# Patient Record
Sex: MALE | Race: WHITE | NOT HISPANIC OR LATINO | Employment: FULL TIME | URBAN - METROPOLITAN AREA
[De-identification: names, ages, dates, MRNs, and addresses within clinical notes are randomized per-mention and may not be internally consistent; named-entity substitution may affect disease eponyms.]

---

## 2017-07-24 DIAGNOSIS — M02.30 REITER'S DISEASE, REITER'S DISEASE OF UNSPECIFIED SITE: ICD-10-CM

## 2017-07-24 NOTE — TELEPHONE ENCOUNTER
Routing refill request to provider for review/approval because:  Drug not on the [facility] refill protocol   Alicia Villanueva RN

## 2017-07-24 NOTE — TELEPHONE ENCOUNTER
adalimumab (HUMIRA PEN) 40 MG/0.8ML pen kit         Last Written Prescription Date: 06/14/16  Last Fill Quantity: 2, # refills: 12  Last Office Visit with [facility], P or [facility] prescribing provider:  11/16/16        BP Readings from Last 3 Encounters:   11/16/16 102/64   06/14/16 116/68   01/15/16 118/64     No results found for: MICROL  No results found for: UMALCR  Creatinine   Date Value Ref Range Status   06/14/2016 0.82 0.66 - 1.25 mg/dL Final   ]  GFR Estimate   Date Value Ref Range Status   06/14/2016 >90  Non  GFR Calc   >60 mL/min/1.7m2 Final   12/12/2014 81 >60 mL/min/1.7m2 Final     Comment:     Non  GFR Calc   11/19/2012 >90 >60 mL/min/1.7m2 Final     GFR Estimate If Black   Date Value Ref Range Status   06/14/2016 >90   GFR Calc   >60 mL/min/1.7m2 Final   12/12/2014 >90   GFR Calc   >60 mL/min/1.7m2 Final   11/19/2012 >90 >60 mL/min/1.7m2 Final     Lab Results   Component Value Date    CHOL 277 10/11/2012     Lab Results   Component Value Date    HDL 63 10/11/2012     Lab Results   Component Value Date     10/11/2012     Lab Results   Component Value Date    TRIG 99 10/11/2012     Lab Results   Component Value Date    CHOLHDLRATIO 4.4 10/11/2012     Lab Results   Component Value Date    AST 24 06/14/2016     Lab Results   Component Value Date    ALT 28 06/14/2016     No results found for: A1C  Potassium   Date Value Ref Range Status   06/14/2016 3.9 3.4 - 5.3 mmol/L Final         [facility]

## 2017-07-25 ENCOUNTER — TELEPHONE (OUTPATIENT)
Dept: RHEUMATOLOGY | Facility: CLINIC | Age: 42
End: 2017-07-25

## 2017-07-25 DIAGNOSIS — M02.30 REITER'S DISEASE, REITER'S DISEASE OF UNSPECIFIED SITE: ICD-10-CM

## 2017-07-25 NOTE — TELEPHONE ENCOUNTER
Patient needs a follow up appointment.  Will call patient to schedule.  Ira Mosqueda CMA  7/25/2017 4:17 PM

## 2017-07-25 NOTE — TELEPHONE ENCOUNTER
Patient needs a follow up appointment before prescription for Humira will be refilled.   Ira Mosqueda CMA  7/25/2017 4:15 PM

## 2017-07-26 NOTE — TELEPHONE ENCOUNTER
3 month supply of Humira refilled.  Please follow up in clinic for future refills.  Please notify Rahul Lev of this.     Roosevelt Flores MD  7/26/2017 1:53 PM

## 2017-07-26 NOTE — TELEPHONE ENCOUNTER
As of today 7/26/17 we only have 2 openings for the month of August between all 3 clinics. August 30th and 31st. Will talk to Dr. Flores to see what he would like done.  Ira Mosqueda CMA  7/26/2017 1:40 PM

## 2017-07-26 NOTE — TELEPHONE ENCOUNTER
Per demographics detailed voice message was left for patient about refill for a 3 month supply of Humira was sent to pharmacy and please make a follow up appointment with Dr. Flores before medication will run out.  Ira Mosqueda CMA  7/26/2017 2:08 PM

## 2017-07-27 ENCOUNTER — TELEPHONE (OUTPATIENT)
Dept: RHEUMATOLOGY | Facility: CLINIC | Age: 42
End: 2017-07-27

## 2017-07-27 NOTE — TELEPHONE ENCOUNTER
Pt is restricted to fill his Humira at [facility] specialty pharmacy at [phone]. Having [facility] transfer med out pt also will be notified

## 2017-09-14 ENCOUNTER — OFFICE VISIT (OUTPATIENT)
Dept: RHEUMATOLOGY | Facility: CLINIC | Age: 42
End: 2017-09-14
Payer: COMMERCIAL

## 2017-09-14 VITALS
OXYGEN SATURATION: 97 % | HEIGHT: 76 IN | WEIGHT: 183.6 LBS | HEART RATE: 64 BPM | DIASTOLIC BLOOD PRESSURE: 60 MMHG | SYSTOLIC BLOOD PRESSURE: 104 MMHG | TEMPERATURE: 97.3 F | BODY MASS INDEX: 22.36 KG/M2

## 2017-09-14 DIAGNOSIS — Z23 NEED FOR PROPHYLACTIC VACCINATION AND INOCULATION AGAINST CHOLERA ALONE: ICD-10-CM

## 2017-09-14 DIAGNOSIS — M02.30 REACTIVE ARTHRITIS (H): Primary | ICD-10-CM

## 2017-09-14 DIAGNOSIS — Z79.899 HIGH RISK MEDICATION USE: ICD-10-CM

## 2017-09-14 LAB
ALBUMIN SERPL-MCNC: 3.8 G/DL (ref 3.4–5)
ALP SERPL-CCNC: 50 U/L (ref 40–150)
ALT SERPL W P-5'-P-CCNC: 30 U/L (ref 0–70)
AST SERPL W P-5'-P-CCNC: 20 U/L (ref 0–45)
BASOPHILS # BLD AUTO: 0 10E9/L (ref 0–0.2)
BASOPHILS NFR BLD AUTO: 0.4 %
BILIRUB DIRECT SERPL-MCNC: 0.2 MG/DL (ref 0–0.2)
BILIRUB SERPL-MCNC: 1.2 MG/DL (ref 0.2–1.3)
CALCIUM SERPL-MCNC: 8.8 MG/DL (ref 8.5–10.1)
CREAT SERPL-MCNC: 0.83 MG/DL (ref 0.66–1.25)
DEPRECATED CALCIDIOL+CALCIFEROL SERPL-MC: 33 UG/L (ref 20–75)
DIFFERENTIAL METHOD BLD: NORMAL
EOSINOPHIL # BLD AUTO: 0.2 10E9/L (ref 0–0.7)
EOSINOPHIL NFR BLD AUTO: 3.2 %
ERYTHROCYTE [DISTWIDTH] IN BLOOD BY AUTOMATED COUNT: 12.9 % (ref 10–15)
GFR SERPL CREATININE-BSD FRML MDRD: >90 ML/MIN/1.7M2
HCT VFR BLD AUTO: 42 % (ref 40–53)
HGB BLD-MCNC: 14.8 G/DL (ref 13.3–17.7)
LYMPHOCYTES # BLD AUTO: 2.5 10E9/L (ref 0.8–5.3)
LYMPHOCYTES NFR BLD AUTO: 44.4 %
MCH RBC QN AUTO: 32.7 PG (ref 26.5–33)
MCHC RBC AUTO-ENTMCNC: 35.2 G/DL (ref 31.5–36.5)
MCV RBC AUTO: 93 FL (ref 78–100)
MONOCYTES # BLD AUTO: 0.5 10E9/L (ref 0–1.3)
MONOCYTES NFR BLD AUTO: 9.1 %
NEUTROPHILS # BLD AUTO: 2.4 10E9/L (ref 1.6–8.3)
NEUTROPHILS NFR BLD AUTO: 42.9 %
PLATELET # BLD AUTO: 278 10E9/L (ref 150–450)
PROT SERPL-MCNC: 7.4 G/DL (ref 6.8–8.8)
RBC # BLD AUTO: 4.53 10E12/L (ref 4.4–5.9)
WBC # BLD AUTO: 5.6 10E9/L (ref 4–11)

## 2017-09-14 PROCEDURE — 82565 ASSAY OF CREATININE: CPT | Performed by: INTERNAL MEDICINE

## 2017-09-14 PROCEDURE — 90471 IMMUNIZATION ADMIN: CPT | Performed by: INTERNAL MEDICINE

## 2017-09-14 PROCEDURE — 36415 COLL VENOUS BLD VENIPUNCTURE: CPT | Performed by: INTERNAL MEDICINE

## 2017-09-14 PROCEDURE — 90670 PCV13 VACCINE IM: CPT | Performed by: INTERNAL MEDICINE

## 2017-09-14 PROCEDURE — 85025 COMPLETE CBC W/AUTO DIFF WBC: CPT | Performed by: INTERNAL MEDICINE

## 2017-09-14 PROCEDURE — 82306 VITAMIN D 25 HYDROXY: CPT | Performed by: INTERNAL MEDICINE

## 2017-09-14 PROCEDURE — 99213 OFFICE O/P EST LOW 20 MIN: CPT | Mod: 25 | Performed by: INTERNAL MEDICINE

## 2017-09-14 PROCEDURE — 82310 ASSAY OF CALCIUM: CPT | Performed by: INTERNAL MEDICINE

## 2017-09-14 PROCEDURE — 80076 HEPATIC FUNCTION PANEL: CPT | Performed by: INTERNAL MEDICINE

## 2017-09-14 NOTE — PROGRESS NOTES
Rheumatology Clinic Visit      Viet Ohara MRN# [id number]   YOB: 1975 Age: 42 year old      Date of visit: 9/14/17   PCP: Dr. Trina Espinoza    Chief Complaint   Patient presents with:  Arthritis: patient states the humira is helping, his fingers are doing good    Assessment and Plan     1. Reactive arthritis: From chart review, he was in [place] where he developed a gastrointestinal illness and was later diagnosed with reactive arthritis with dactylitis in 2012. Per the patient, previous treatments include prednisone (ineffective), methotrexate (ineffective), sulfasalazine (ineffective), then Humira that was very effective. He discontinued Humira because he was doing well and all of the symptoms returned. Asymptomatic after restarting Humira. Currently doing very well. He is trying to extend Humira to every 3 weeks consistently and this is okay; if he has return of symptoms then he will return to the frquency that was previously effective.  Never had systemic symptoms such as fatigue.   Calcium low on previous labs so will recheck Ca and also check Vitamin D today.   - Continue Humira 40 mg subcutaneous every 14 days  - Labs today: CBC, Cr, Hepatic Panel, Vitamin D, Ca    # Adalimumab (Humira) Risks and Benefits: The risks and benefits of adalimumab were discussed in detail and the patient verbalized understanding.  The risks discussed include, but are not limited to, the risk for hypersensitivity, anaphylaxis, anaphylactoid reactions, an increased risk for serious infections leading to hospitalization or death, a possible increased risk for lymphoma and other malignancies, a possible worsening of demyelinating diseases, a possible worsening of heart failure, risk for cytopenias, risk for drug induced lupus, possible reactivation of hepatitis B, and possible reactivation of latent tuberculosis.  Subcutaneous injections may result in injection site reactions and/or pain at the site of injection.   The most common adverse reactions are infections, injection site reactions, headache, and rash.  It was discussed that the medication would need to be discontinued if a serious infection develops.  It was discussed that live vaccinations should not be received while using adalimumab or within 30 days prior to starting adalimumab.  I encouraged reviewing the package insert and asking any questions about the medication.      2. Vaccinations: Vaccinations reviewed with Mr. Ohara.  Risks and benefits of vaccinations were discussed.  - Influenza: he plans to receive at the [facility] with his family  - [id number]: will receive today  - [id number]: up to date  - Zostavax: contraindicated at this time    Mr. Ohara verbalized agreement with and understanding of the rational for the diagnosis and treatment plan.  All questions were answered to best of my ability and the patient's satisfaction. Mr. Ohara was advised to contact the clinic with any questions that may arise after the clinic visit.      Thank you for involving me in the care of the patient    Return to clinic: 11-12 months      HPI   Viet Ohara is a 42 year old male with a medical history significant for reactive arthritis, rupture of plantaris tendon, and history of trigger finger who presents for follow-up of reactive arthritis.    Previously, Mr. Ohara reported that prednisone was effective in the past but NSAIDs, methotrexate, and sulfasalazine were ineffective. Humira has been very effective. He discontinued Humira one point and all the symptoms returned. Later, he started taking Humira every 3 weeks instead of every 2 weeks and his symptoms returned.      Today, he reports doing very well with no joint pain or joint stiffness. He is trying to use Humira every 2.5-3 weeks instead of every 14 days and says that his symptoms are still well controlled. No gelling phenomenon.  No fatigue. He plans to receive the flu shot at the [facility] with his family, something  they do every year.      Denies fevers, chills, nausea, vomiting, constipation, diarrhea. No abdominal pain. No chest pain/pressure, palpitations, or shortness of breath. No oral or nasal sores. No neck pain. No rash. No LE swelling.  No photosensitivity or photophobia.  No sicca symptoms.  No eye pain or redness.     Tobacco: none  EtOH: 1 beer daily  Drugs: none  Occupation: Operates the camera for [employer]    ROS   GEN: No fevers, chills, night sweats, fatigue, or weight change  SKIN: No itching, rashes, sores  HEENT:  No epistaxis. No oral or nasal ulcers.  CV: No chest pain, pressure, palpitations, or dyspnea on exertion.  PULM: No SOB, wheeze, cough.  GI: No nausea, vomiting, constipation, diarrhea. No blood in stool. No abdominal pain.  : No blood in urine.  MSK: See HPI.  NEURO: No numbness, tingling, or weakness.  EXT: No LE swelling  PSYCH: negative    Active Problem List     Patient Active Problem List   Diagnosis     CARDIOVASCULAR SCREENING; LDL GOAL LESS THAN 160     Trigger finger     Reactive arthritis (H)     Encounter for long-term current use of medication     Rupture of plantaris tendon     Baker's cyst of knee     High risk medication use     Past Medical History     Past Medical History:   Diagnosis Date     CARDIOVASCULAR SCREENING; LDL GOAL LESS THAN 160 10/31/2010     Reactive arthritis (H)      Trigger finger     Bilateral Hands that require periodic steroid shots     Past Surgical History     Past Surgical History:   Procedure Laterality Date     C NONSPECIFIC PROCEDURE      multiple hand/ortho for trigger finger release x 4     Allergy     Allergies   Allergen Reactions     Nkda [No Known Drug Allergies]      Current Medication List     Current Outpatient Prescriptions   Medication Sig     adalimumab (HUMIRA PEN) 40 MG/0.8ML pen kit Inject 0.8 mLs (40 mg) Subcutaneous every 14 days     No current facility-administered medications for this visit.      Social History   See  "HPI    Family History     Family History   Problem Relation Age of Onset     CANCER Paternal Grandfather      lung +tob     C.A.D. No family hx of      DIABETES No family hx of      Hypertension No family hx of      CEREBROVASCULAR DISEASE No family hx of      Connective Tissue Disorder No family hx of      Physical Exam     Temp Readings from Last 3 Encounters:   09/14/17 97.3  F (36.3  C) (Oral)   11/16/16 97.7  F (36.5  C)   06/14/16 98.3  F (36.8  C) (Oral)     BP Readings from Last 5 Encounters:   09/14/17 104/60   11/16/16 102/64   06/14/16 116/68   01/15/16 118/64   09/11/15 95/58     Pulse Readings from Last 1 Encounters:   09/14/17 64     Resp Readings from Last 1 Encounters:   11/16/16 16     Estimated body mass index is 22.65 kg/(m^2) as calculated from the following:    Height as of this encounter: 1.918 m (6' 3.5\").    Weight as of this encounter: 83.3 kg (183 lb 9.6 oz).    GEN: NAD  HEENT: MMM. No oral lesions. Anicteric, noninjected sclera  CV: S1, S2. RRR. No m/r/g.  PULM: CTA bilaterally. No w/c.  MSK:  Hands, wrists, elbows, shoulders, knees, ankles, and MTPs without swelling or tenderness to palpation. Hips nontender to direct palpation. Achilles tendons nontender to palpation. No dactylitis.    NEURO: UE and LE strengths 5/5 and equal bilaterally.   SKIN: No rash  EXT: No LE edema  PSYCH: Alert. Appropriate.    Labs / Imaging (select studies)   RF/CCP  Recent Labs   Lab Test  09/25/12   1411  06/26/12   1030  06/05/12   1059   CCPABY  <20 Interpretation:  Negative   --   <20 Interpretation:  Negative   RHF  9  7   --      LATRICE/RNP/Sm/SSA/SSB  Recent Labs   Lab Test  06/26/12   1030   OPAL  <1.0 Interpretation:  Negative     HLA-B27  Recent Labs   Lab Test  12/12/14   1052  10/18/12   1241   [id number]  SSOP  SSOP   B1  B27 Neg  B27 Neg     CBC  Recent Labs   Lab Test  11/16/16   0949  06/14/16   1419  01/15/16   0846   WBC  5.7  6.7  5.1   RBC  4.50  4.48  4.74   HGB  14.5  14.5  14.9   HCT  41.9 "  41.5  43.7   MCV  93  93  92   RDW  12.5  12.6  12.6   PLT  269  272  280   MCH  32.2  32.4  31.4   MCHC  34.6  34.9  34.1   NEUTROPHIL  51.7  46.3  45.5   LYMPH  36.5  44.5  42.7   MONOCYTE  8.7  7.0  8.5   EOSINOPHIL  2.8  1.9  3.1   BASOPHIL  0.3  0.3  0.2   ANEU  3.0  3.1  2.3   ALYM  2.1  3.0  2.2   BALWINDER  0.5  0.5  0.4   AEOS  0.2  0.1  0.2   ABAS  0.0  0.0  0.0     CMP  Recent Labs   Lab Test  06/14/16   1419  01/15/16   0846  09/11/15   0903  05/05/15   0810   12/12/14   1052  11/19/12   0852 10/11/12  09/25/12   1411  04/06/12   1424   06/02/10   1144   NA  138   --    --    --    --   136   --   138   --   140   --    --    POTASSIUM  3.9   --    --    --    --   4.2   --   4.1   --   4.4   --    --    CHLORIDE  105   --    --    --    --   102   --   104   --   99   --    --    CO2  24   --    --    --    --   29   --    --    --   26   --    --    ANIONGAP  9   --    --    --    --   5   --    --    --   15   --    --    GLC  90   --    --    --    --   81   --   96   --   103*   --   83   BUN  11   --    --    --    --   15   --   16   --   15   --    --    CR  0.82   --    --    --    --   1.02  0.77  0.9  0.80  0.82   < >   --    GFRESTIMATED  >90  Non  GFR Calc     --    --    --    --   81  >90  >60  >90  >90   < >   --    GFRESTBLACK  >90   GFR Calc     --    --    --    --   >90   GFR Calc    >90   --   >90  >90   < >   --    LULU  8.4*   --    --    --    --   8.8   --   9.5   --   8.8   --    --    BILITOTAL  1.4*  1.4*  1.4*  1.3   < >  1.0   --   1.4   --   0.6   < >   --    ALBUMIN  3.9  4.0  4.0  3.8   < >  4.1  3.7*  3.5   --   4.0   < >   --    PROTTOTAL  7.8  7.5  7.6  7.4   < >  7.9   --   7.4   --   8.2   < >   --    ALKPHOS  53  60  51  59   < >  63   --   346*  63  73   < >   --    AST  24  25  33  20   < >  25  46*  83*  30  33   < >   --    ALT  28  30  41  28   < >  43  58  122*   --   19   < >   --     < > = values in this interval  not displayed.     Uric Acid  Recent Labs   Lab Test  09/25/12   1411   URIC  5.3     Iron Studies  Recent Labs   Lab Test  04/06/12   1424   IRON  43   FEB  249   IRONSAT  17     Calcium/VitaminD  Recent Labs   Lab Test  06/14/16   1419  12/12/14   1052 10/11/12   LULU  8.4*  8.8  9.5     ESR/CRP  Recent Labs   Lab Test  12/12/14   1052  11/19/12   0852  09/25/12   1411  06/26/12   1030   SED   --   10  13  25*   CRP  <2.9  5.3  6.2  6.2     TSH/T4  Recent Labs   Lab Test  04/06/12   1424   TSH  1.66     Lipid Panel  Recent Labs   Lab Test 10/11/12  06/02/10   1144   CHOL  277*  196   TRIG  99   --    HDL  63  34*   LDL  194*   --    VLDL  19   --    CHOLHDLRATIO  4.4   --      Hepatitis B  Recent Labs   Lab Test  06/14/16   1419  05/08/15   0928  01/17/13   1554   HBCAB  Nonreactive   --    --    HEPBANG  Nonreactive  Nonreactive  Negative     Hepatitis C  Recent Labs   Lab Test  06/14/16   1419  05/08/15   0928  01/17/13   1554   HCVAB  Nonreactive   Assay performance characteristics have not been established for newborns,   infants, and children    Nonreactive   Assay performance characteristics have not been established for newborns,   infants, and children    Negative     Tuberculosis Screening  Recent Labs   Lab Test  06/14/16   1418  05/08/15   0930  01/17/13   1553   TBRSLT  Indeterminate   Results are indeterminate, possible impaired immune response.  Consider   submitting a repeat sample in 1 to 2 months.  *  Negative  Negative   TBAGN  0.00  0.01  0.03     HIV Screening  Recent Labs   Lab Test  06/14/16   1419   HIAGAB  Nonreactive   HIV-1 p24 Ag & HIV-1/HIV-2 Ab Not Detected       Immunization History     Immunization History   Administered Date(s) Administered     Influenza (IIV3) 11/15/2011     Pneumococcal 23 valent 01/17/2013     TDAP Vaccine (Adacel) 02/20/2015          Chart documentation done in part with Dragon Voice recognition Software. Although reviewed after completion, some word and  grammatical error may remain.    Roosevelt Flores MD

## 2017-09-14 NOTE — NURSING NOTE
"Chief Complaint   Patient presents with     Arthritis     patient states the humira is helping, his fingers are doing good       Initial /60 (BP Location: Left arm, Patient Position: Chair, Cuff Size: Adult Regular)  Pulse 64  Temp 97.3  F (36.3  C) (Oral)  Ht 1.918 m (6' 3.5\")  Wt 83.3 kg (183 lb 9.6 oz)  SpO2 97%  BMI 22.65 kg/m2 Estimated body mass index is 22.65 kg/(m^2) as calculated from the following:    Height as of this encounter: 1.918 m (6' 3.5\").    Weight as of this encounter: 83.3 kg (183 lb 9.6 oz).  BP completed using cuff size: regular         RAPID3 (0-30) Cumulative Score  0          RAPID3 Weighted Score (divide #4 by 3 and that is the weighted score)  0         "

## 2017-09-14 NOTE — LETTER
[facility]  [address] [place] [address]    September 18, 2017    Viet Ohara  [address] [place] [id number]          Dear Viet,    Your labs are normal.     Please let me know if you have any questions    Enclosed is a copy of your results.     Results for orders placed or performed in visit on 09/14/17   CBC with platelets differential   Result Value Ref Range    WBC 5.6 4.0 - 11.0 10e9/L    RBC Count 4.53 4.4 - 5.9 10e12/L    Hemoglobin 14.8 13.3 - 17.7 g/dL    Hematocrit 42.0 40.0 - 53.0 %    MCV 93 78 - 100 fl    MCH 32.7 26.5 - 33.0 pg    MCHC 35.2 31.5 - 36.5 g/dL    RDW 12.9 10.0 - 15.0 %    Platelet Count 278 150 - 450 10e9/L    Diff Method Automated Method     % Neutrophils 42.9 %    % Lymphocytes 44.4 %    % Monocytes 9.1 %    % Eosinophils 3.2 %    % Basophils 0.4 %    Absolute Neutrophil 2.4 1.6 - 8.3 10e9/L    Absolute Lymphocytes 2.5 0.8 - 5.3 10e9/L    Absolute Monocytes 0.5 0.0 - 1.3 10e9/L    Absolute Eosinophils 0.2 0.0 - 0.7 10e9/L    Absolute Basophils 0.0 0.0 - 0.2 10e9/L   Creatinine   Result Value Ref Range    Creatinine 0.83 0.66 - 1.25 mg/dL    GFR Estimate >90 >60 mL/min/1.7m2    GFR Estimate If Black >90 >60 mL/min/1.7m2   Hepatic panel   Result Value Ref Range    Bilirubin Direct 0.2 0.0 - 0.2 mg/dL    Bilirubin Total 1.2 0.2 - 1.3 mg/dL    Albumin 3.8 3.4 - 5.0 g/dL    Protein Total 7.4 6.8 - 8.8 g/dL    Alkaline Phosphatase 50 40 - 150 U/L    ALT 30 0 - 70 U/L    AST 20 0 - 45 U/L   Vitamin D Deficiency   Result Value Ref Range    Vitamin D Deficiency screening 33 20 - 75 ug/L   Calcium   Result Value Ref Range    Calcium 8.8 8.5 - 10.1 mg/dL       If you have any questions or concerns, please call myself or my nurse at [phone].      Sincerely,        Roosevelt Flores MD/JULIA

## 2017-09-14 NOTE — MR AVS SNAPSHOT
After Visit Summary   9/14/2017    Viet Ohara    MRN: [id number]           Patient Information     Date Of Birth          1975        Visit Information        Provider Department      9/14/2017 8:40 AM Roosevelt Flores MD [facility] [place]        Today's Diagnoses     Reactive arthritis (H)    -  1    High risk medication use           Follow-ups after your visit        Follow-up notes from your care team     Return in about 1 year (around 9/14/2018) for f/u Reactive arthritis.      Your next 10 appointments already scheduled     Aug 16, 2018  8:40 AM CDT   Return Visit with Roosevelt Flores MD   [facility] [place] ([facility])    [address] [address] [phone]              Who to contact     If you have questions or need follow up information about today's clinic visit or your schedule please contact [facility] directly at [phone].  Normal or non-critical lab and imaging results will be communicated to you by ITeamhart, letter or phone within 4 business days after the clinic has received the results. If you do not hear from us within 7 days, please contact the clinic through ITeamhart or phone. If you have a critical or abnormal lab result, we will notify you by phone as soon as possible.  Submit refill requests through DoYouRemember or call your pharmacy and they will forward the refill request to us. Please allow 3 business days for your refill to be completed.          Additional Information About Your Visit        MyChart Information     DoYouRemember gives you secure access to your electronic health record. If you see a primary care provider, you can also send messages to your care team and make appointments. If you have questions, please call your primary care clinic.  If you do not have a primary care provider, please call [phone] and they will assist you.        Care EveryWhere ID     This is your Care EveryWhere ID. This  "could be used by other organizations to access your [facility] medical records  [id number]        Your Vitals Were     Pulse Temperature Height Pulse Oximetry BMI (Body Mass Index)       64 97.3  F (36.3  C) (Oral) 1.918 m (6' 3.5\") 97% 22.65 kg/m2        Blood Pressure from Last 3 Encounters:   09/14/17 104/60   11/16/16 102/64   06/14/16 116/68    Weight from Last 3 Encounters:   09/14/17 83.3 kg (183 lb 9.6 oz)   11/16/16 83.9 kg (185 lb)   06/14/16 82.6 kg (182 lb)              We Performed the Following     Calcium     CBC with platelets differential     Creatinine     Hepatic panel     Vitamin D Deficiency          Where to get your medicines      These medications were sent to [facility] - [place], [place] - [address]  [address] [facility] [address] [facility] [address]    Hours:  Mon-Fri 8:30am-5:00pm Toll Free [phone] Phone:  [phone]     adalimumab 40 MG/0.8ML pen kit          Primary Care Provider Office Phone # Fax #    Trina Josefina Espinoza [phone] [address]  [facility] [address]        Equal Access to Services     MICHELLE RAYMOND AH: Hadmendoza cortezo Soomaali, waaxda luqadaha, qaybta kaalmada adeegyada, guero caldera haynura pérez. So [facility] [phone].    ATENCIÓN: Si habla español, tiene a ramírez disposición servicios gratuitos de asistencia lingüística. Llame al [phone].    We comply with applicable federal civil rights laws and [place] laws. We do not discriminate on the basis of race, color, national origin, age, disability sex, sexual orientation or gender identity.            Thank you!     Thank you for choosing [facility] [place]  for your care. Our goal is always to provide you with excellent care. Hearing back from our patients is one way we can continue to improve our services. Please take a few minutes to complete the written survey that you may receive in the mail after your visit with us. Thank " you!             Your Updated Medication List - Protect others around you: Learn how to safely use, store and throw away your medicines at www.disposemymeds.org.          This list is accurate as of: 9/14/17  9:18 AM.  Always use your most recent med list.                   Brand Name Dispense Instructions for use Diagnosis    adalimumab 40 MG/0.8ML pen kit    HUMIRA PEN    2 each    Inject 0.8 mLs (40 mg) Subcutaneous every 14 days    Reactive arthritis (H)

## 2017-09-18 NOTE — PROGRESS NOTES
"Please mail Mr. Ohara his results with the following message.    \"Mr. Ohara,    Your labs are normal.    Please let me know if you have any questions.    Sincerely,  Roosevelt Flores MD  9/18/2017 6:32 AM\"  "

## 2018-08-09 ENCOUNTER — OFFICE VISIT (OUTPATIENT)
Dept: RHEUMATOLOGY | Facility: CLINIC | Age: 43
End: 2018-08-09
Payer: COMMERCIAL

## 2018-08-09 VITALS
DIASTOLIC BLOOD PRESSURE: 66 MMHG | OXYGEN SATURATION: 97 % | HEIGHT: 76 IN | WEIGHT: 184 LBS | SYSTOLIC BLOOD PRESSURE: 113 MMHG | BODY MASS INDEX: 22.41 KG/M2 | HEART RATE: 68 BPM | RESPIRATION RATE: 14 BRPM

## 2018-08-09 DIAGNOSIS — Z79.899 HIGH RISK MEDICATION USE: ICD-10-CM

## 2018-08-09 DIAGNOSIS — M02.30 REACTIVE ARTHRITIS (H): Primary | ICD-10-CM

## 2018-08-09 LAB
ALBUMIN SERPL-MCNC: 3.8 G/DL (ref 3.4–5)
ALP SERPL-CCNC: 51 U/L (ref 40–150)
ALT SERPL W P-5'-P-CCNC: 25 U/L (ref 0–70)
AST SERPL W P-5'-P-CCNC: 23 U/L (ref 0–45)
BASOPHILS # BLD AUTO: 0 10E9/L (ref 0–0.2)
BASOPHILS NFR BLD AUTO: 0.4 %
BILIRUB DIRECT SERPL-MCNC: 0.2 MG/DL (ref 0–0.2)
BILIRUB SERPL-MCNC: 1.3 MG/DL (ref 0.2–1.3)
CREAT SERPL-MCNC: 0.95 MG/DL (ref 0.66–1.25)
CRP SERPL-MCNC: <2.9 MG/L (ref 0–8)
DIFFERENTIAL METHOD BLD: NORMAL
EOSINOPHIL # BLD AUTO: 0.2 10E9/L (ref 0–0.7)
EOSINOPHIL NFR BLD AUTO: 3.9 %
ERYTHROCYTE [DISTWIDTH] IN BLOOD BY AUTOMATED COUNT: 12.9 % (ref 10–15)
ERYTHROCYTE [SEDIMENTATION RATE] IN BLOOD BY WESTERGREN METHOD: 7 MM/H (ref 0–15)
GFR SERPL CREATININE-BSD FRML MDRD: 87 ML/MIN/1.7M2
HCT VFR BLD AUTO: 41.6 % (ref 40–53)
HGB BLD-MCNC: 14.3 G/DL (ref 13.3–17.7)
LYMPHOCYTES # BLD AUTO: 2.4 10E9/L (ref 0.8–5.3)
LYMPHOCYTES NFR BLD AUTO: 41.2 %
MCH RBC QN AUTO: 31.9 PG (ref 26.5–33)
MCHC RBC AUTO-ENTMCNC: 34.4 G/DL (ref 31.5–36.5)
MCV RBC AUTO: 93 FL (ref 78–100)
MONOCYTES # BLD AUTO: 0.5 10E9/L (ref 0–1.3)
MONOCYTES NFR BLD AUTO: 8.8 %
NEUTROPHILS # BLD AUTO: 2.6 10E9/L (ref 1.6–8.3)
NEUTROPHILS NFR BLD AUTO: 45.7 %
PLATELET # BLD AUTO: 273 10E9/L (ref 150–450)
PROT SERPL-MCNC: 7.4 G/DL (ref 6.8–8.8)
RBC # BLD AUTO: 4.48 10E12/L (ref 4.4–5.9)
WBC # BLD AUTO: 5.7 10E9/L (ref 4–11)

## 2018-08-09 PROCEDURE — 80076 HEPATIC FUNCTION PANEL: CPT | Performed by: INTERNAL MEDICINE

## 2018-08-09 PROCEDURE — 99213 OFFICE O/P EST LOW 20 MIN: CPT | Performed by: INTERNAL MEDICINE

## 2018-08-09 PROCEDURE — 82565 ASSAY OF CREATININE: CPT | Performed by: INTERNAL MEDICINE

## 2018-08-09 PROCEDURE — 86140 C-REACTIVE PROTEIN: CPT | Performed by: INTERNAL MEDICINE

## 2018-08-09 PROCEDURE — 85025 COMPLETE CBC W/AUTO DIFF WBC: CPT | Performed by: INTERNAL MEDICINE

## 2018-08-09 PROCEDURE — 86480 TB TEST CELL IMMUN MEASURE: CPT | Performed by: INTERNAL MEDICINE

## 2018-08-09 PROCEDURE — 85652 RBC SED RATE AUTOMATED: CPT | Performed by: INTERNAL MEDICINE

## 2018-08-09 PROCEDURE — 36415 COLL VENOUS BLD VENIPUNCTURE: CPT | Performed by: INTERNAL MEDICINE

## 2018-08-09 NOTE — PATIENT INSTRUCTIONS
Rheumatology    Dr. Roosevelt Flores         [facility]   (Monday)  [address]  [place] [address]       [facility]   (Tuesday)  [address]  [place] [address]    [facility]   (Wed., Thurs., and Friday)  [address] [place] [address]    Phone number: [phone]  Thank you for choosing [facility].  Ira Mosqueda CMA

## 2018-08-09 NOTE — PROGRESS NOTES
Rheumatology Clinic Visit      Viet Ohara MRN# [id number]   YOB: 1975 Age: 43 year old      Date of visit: 8/09/18   PCP: Dr. Trina Espinoza    Chief Complaint   Patient presents with:  Arthritis: reactive arthritis, patient is doing great    Assessment and Plan     1. Reactive arthritis: From chart review, he was in [place] where he developed a gastrointestinal illness and was later diagnosed with reactive arthritis with dactylitis in 2012. Per the patient, previous treatments include prednisone (ineffective), methotrexate (ineffective), sulfasalazine (ineffective), then Humira that was very effective. He discontinued Humira because he was doing well and all of the symptoms returned. Asymptomatic after restarting Humira. Currently doing very well. He is taking Humira every 2-4 weeks, mostly every 4 weeks; advised that it is okay to take every 4 weeks and if return of symptoms then he is to change to every 3 weeks and if symptomatic on that frequency to return to every 2 weeks.  Note that symptoms did return when held for 6 weeks per patient report so it is clearly working for him. Never had systemic symptoms such as fatigue.      - Continue Humira 40 mg subcutaneous every 14 days (okay to take every 21 or 28 days as long as symptoms do not return)  - Labs today: CBC, Cr, Hepatic Panel, ESR, CRP, Quantiferon TB    Mr. Ohara verbalized agreement with and understanding of the rational for the diagnosis and treatment plan.  All questions were answered to best of my ability and the patient's satisfaction. Mr. Ohara was advised to contact the clinic with any questions that may arise after the clinic visit.      Thank you for involving me in the care of the patient    Return to clinic: 11-12 months      HPI   Viet Ohara is a 43 year old male with a medical history significant for reactive arthritis, rupture of plantaris tendon, and history of trigger finger who presents for follow-up of reactive  arthritis.    Previously, Mr. Ohara reported that prednisone was effective in the past but NSAIDs, methotrexate, and sulfasalazine were ineffective. Humira has been very effective. He discontinued Humira one point and all the symptoms returned. Later, he started taking Humira every 3 weeks instead of every 2 weeks and his symptoms returned.      Today, he reports doing very well with no joint pain when taking Humira at least every 4 weeks.  He tried changing the Humira to be every 3 weeks and felt well so changed Humira to every 4 weeks with no worsening symptoms.  He did not take humira for 6 weeks once and had more pain in his left hand that resolved with taking the next Humira dose.       Denies fevers, chills, nausea, vomiting, constipation, diarrhea. No abdominal pain. No chest pain/pressure, palpitations, or shortness of breath. No oral or nasal sores. No neck pain. No rash. No LE swelling.    Tobacco: none  EtOH: 1 beer daily  Drugs: none  Occupation: Operates the camera for [employer]    ROS   GEN: No fevers, chills, night sweats, fatigue, or weight change  SKIN: No itching, rashes, sores  HEENT:  No epistaxis. No oral or nasal ulcers.  CV: No chest pain, pressure, palpitations, or dyspnea on exertion.  PULM: No SOB, wheeze, cough.  GI: No nausea, vomiting, constipation, diarrhea. No blood in stool. No abdominal pain.  : No blood in urine.  MSK: See HPI.  NEURO: No numbness, tingling, or weakness.  EXT: No LE swelling  PSYCH: negative    Active Problem List     Patient Active Problem List   Diagnosis     CARDIOVASCULAR SCREENING; LDL GOAL LESS THAN 160     Trigger finger     Reactive arthritis (H)     Encounter for long-term current use of medication     Rupture of plantaris tendon     Baker's cyst of knee     High risk medication use     Past Medical History     Past Medical History:   Diagnosis Date     CARDIOVASCULAR SCREENING; LDL GOAL LESS THAN 160 10/31/2010     Reactive arthritis (H)      Trigger  "finger     Bilateral Hands that require periodic steroid shots     Past Surgical History     Past Surgical History:   Procedure Laterality Date     C NONSPECIFIC PROCEDURE      multiple hand/ortho for trigger finger release x 4     Allergy     Allergies   Allergen Reactions     Nkda [No Known Drug Allergies]      Current Medication List     Current Outpatient Prescriptions   Medication Sig     adalimumab (HUMIRA PEN) 40 MG/0.8ML pen kit Inject 0.8 mLs (40 mg) Subcutaneous every 14 days     No current facility-administered medications for this visit.      Social History   See HPI    Family History     Family History   Problem Relation Age of Onset     Cancer Paternal Grandfather      lung +tob     C.A.D. No family hx of      Diabetes No family hx of      Hypertension No family hx of      Cerebrovascular Disease No family hx of      Connective Tissue Disorder No family hx of      Physical Exam     Temp Readings from Last 3 Encounters:   09/14/17 97.3  F (36.3  C) (Oral)   11/16/16 97.7  F (36.5  C)   06/14/16 98.3  F (36.8  C) (Oral)     BP Readings from Last 5 Encounters:   08/09/18 113/66   09/14/17 104/60   11/16/16 102/64   06/14/16 116/68   01/15/16 118/64     Pulse Readings from Last 1 Encounters:   08/09/18 68     Resp Readings from Last 1 Encounters:   08/09/18 14     Estimated body mass index is 22.69 kg/(m^2) as calculated from the following:    Height as of this encounter: 1.918 m (6' 3.5\").    Weight as of this encounter: 83.5 kg (184 lb).    GEN: NAD  HEENT: MMM. No oral lesions. Anicteric, noninjected sclera  CV: S1, S2. RRR. No m/r/g.  PULM: CTA bilaterally. No w/c.  MSK:  Hands, wrists, elbows, shoulders, knees, ankles, and MTPs without swelling or tenderness to palpation. Hips nontender to direct palpation. Achilles tendons nontender to palpation. No dactylitis.    NEURO: UE and LE strengths 5/5 and equal bilaterally.   SKIN: No rash  EXT: No LE edema  PSYCH: Alert. Appropriate.    Labs / Imaging " (select studies)   RF/CCP  Recent Labs   Lab Test  09/25/12   1411  06/26/12   1030  06/05/12   1059   CCPABY  <20 Interpretation:  Negative   --   <20 Interpretation:  Negative   RHF  9  7   --      CBC  Recent Labs   Lab Test  09/14/17   0920  11/16/16   0949  06/14/16   1419   WBC  5.6  5.7  6.7   RBC  4.53  4.50  4.48   HGB  14.8  14.5  14.5   HCT  42.0  41.9  41.5   MCV  93  93  93   RDW  12.9  12.5  12.6   PLT  278  269  272   MCH  32.7  32.2  32.4   MCHC  35.2  34.6  34.9   NEUTROPHIL  42.9  51.7  46.3   LYMPH  44.4  36.5  44.5   MONOCYTE  9.1  8.7  7.0   EOSINOPHIL  3.2  2.8  1.9   BASOPHIL  0.4  0.3  0.3   ANEU  2.4  3.0  3.1   ALYM  2.5  2.1  3.0   BALWINDER  0.5  0.5  0.5   AEOS  0.2  0.2  0.1   ABAS  0.0  0.0  0.0     CMP  Recent Labs   Lab Test  09/14/17   0920  06/14/16   1419  01/15/16   0846   12/12/14   1052  10/11/12   04/06/12   1424   NA   --   138   --    --   136   --   138   --   140   POTASSIUM   --   3.9   --    --   4.2   --   4.1   --   4.4   CHLORIDE   --   105   --    --   102   --   104   --   99   CO2   --   24   --    --   29   --    --    --   26   ANIONGAP   --   9   --    --   5   --    --    --   15   GLC   --   90   --    --   81   --   96   --   103*   BUN   --   11   --    --   15   --   16   --   15   CR  0.83  0.82   --    --   1.02   < >  0.9   < >  0.82   GFRESTIMATED  >90  >90  Non African American GFR Calc     --    --   81   < >  >60   < >  >90   GFRESTBLACK  >90  >90  African American GFR Calc     --    --   >90   GFR Calc     < >   --    < >  >90   LULU  8.8  8.4*   --    --   8.8   --   9.5   --   8.8   BILITOTAL  1.2  1.4*  1.4*   < >  1.0   --   1.4   --   0.6   ALBUMIN  3.8  3.9  4.0   < >  4.1   < >  3.5   --   4.0   PROTTOTAL  7.4  7.8  7.5   < >  7.9   --   7.4   --   8.2   ALKPHOS  50  53  60   < >  63   --   346*   < >  73   AST  20  24  25   < >  25   < >  83*   < >  33   ALT  30  28  30   < >  43   < >  122*   --   19    < > = values in this  interval not displayed.     Calcium/VitaminD  Recent Labs   Lab Test  09/14/17   0920  06/14/16   1419  12/12/14   1052   LULU  8.8  8.4*  8.8   VITDT  33   --    --      ESR/CRP  Recent Labs   Lab Test  12/12/14   1052  11/19/12   0852  09/25/12   1411  06/26/12   1030   SED   --   10  13  25*   CRP  <2.9  5.3  6.2  6.2     Hepatitis B  Recent Labs   Lab Test  06/14/16   1419  05/08/15   0928  01/17/13   1554   HBCAB  Nonreactive   --    --    HEPBANG  Nonreactive  Nonreactive  Negative     Hepatitis C  Recent Labs   Lab Test  06/14/16   1419  05/08/15   0928  01/17/13   1554   HCVAB  Nonreactive   Assay performance characteristics have not been established for newborns,   infants, and children    Nonreactive   Assay performance characteristics have not been established for newborns,   infants, and children    Negative     Tuberculosis Screening  Recent Labs   Lab Test  06/14/16   1418  05/08/15   0930  01/17/13   1553   TBRSLT  Indeterminate   Results are indeterminate, possible impaired immune response.  Consider   submitting a repeat sample in 1 to 2 months.  *  Negative  Negative   TBAGN  0.00  0.01  0.03     HIV Screening  Recent Labs   Lab Test  06/14/16   1419   HIAGAB  Nonreactive   HIV-1 p24 Ag & HIV-1/HIV-2 Ab Not Detected       Immunization History     Immunization History   Administered Date(s) Administered     Influenza (IIV3) PF 11/15/2011     Pneumo Conj 13-V (2010&after) 09/14/2017     Pneumococcal 23 valent 01/17/2013     TDAP Vaccine (Adacel) 02/20/2015          Chart documentation done in part with Dragon Voice recognition Software. Although reviewed after completion, some word and grammatical error may remain.    Roosevelt Flores MD

## 2018-08-09 NOTE — MR AVS SNAPSHOT
After Visit Summary   8/9/2018    Viet Ohara    MRN: [id number]           Patient Information     Date Of Birth          1975        Visit Information        Provider Department      8/9/2018 8:40 AM Roosevelt Flores MD [facility]        Today's Diagnoses     Reactive arthritis (H)    -  1    High risk medication use          Care Instructions    Rheumatology    Dr. Roosevelt [place] [facility]   (Monday)  [address]  [place] [address]       [facility]   (Tuesday)  [address] [place] [address]    [facility]   (Wed., Thurs., and Friday)  [address] [place] [address]    Phone number: [phone]  Thank you for choosing [facility].  Ira Mosqueda CMA            Follow-ups after your visit        Your next 10 appointments already scheduled     Aug 08, 2019  8:40 AM CDT   Return Visit with Roosevelt Flores MD   [facility] ([facility])    [address] [facility] [id number] [phone]              Who to contact     If you have questions or need follow up information about today's clinic visit or your schedule please contact [facility] directly at [phone].  Normal or non-critical lab and imaging results will be communicated to you by MyChart, letter or phone within 4 business days after the clinic has received the results. If you do not hear from us within 7 days, please contact the clinic through MyChart or phone. If you have a critical or abnormal lab result, we will notify you by phone as soon as possible.  Submit refill requests through [employer] or call your pharmacy and they will forward the refill request to us. Please allow 3 business days for your refill to be completed.          Additional Information About Your Visit        [employer] Information     [employer] gives you secure access to your electronic health record. If you see a primary care provider, you can also  "send messages to your care team and make appointments. If you have questions, please call your primary care clinic.  If you do not have a primary care provider, please call [phone] and they will assist you.        Care EveryWhere ID     This is your Care EveryWhere ID. This could be used by other organizations to access your [facility] medical records  [id number]        Your Vitals Were     Pulse Respirations Height Pulse Oximetry BMI (Body Mass Index)       68 14 1.918 m (6' 3.5\") 97% 22.69 kg/m2        Blood Pressure from Last 3 Encounters:   08/09/18 113/66   09/14/17 104/60   11/16/16 102/64    Weight from Last 3 Encounters:   08/09/18 83.5 kg (184 lb)   09/14/17 83.3 kg (183 lb 9.6 oz)   11/16/16 83.9 kg (185 lb)              We Performed the Following     CBC with platelets differential     Creatinine     CRP inflammation     Erythrocyte sedimentation rate auto     Hepatic panel     Quantiferon TB Gold Plus          Today's Medication Changes          These changes are accurate as of 8/9/18  8:55 AM.  If you have any questions, ask your nurse or doctor.               These medicines have changed or have updated prescriptions.        Dose/Directions    adalimumab 40 MG/0.8ML pen kit   Commonly known as:  HUMIRA PEN   This may have changed:  additional instructions   Used for:  Reactive arthritis (H)   Changed by:  Roosevelt Flores MD        Dose:  40 mg   Inject 0.8 mLs (40 mg) Subcutaneous every 14 days ; okay to take every 3-4 weeks as long as symptoms do not return.   Quantity:  2 each   Refills:  12            Where to get your medicines      Call your pharmacy to confirm that your medication is ready for pickup. It may take up to 24 hours for them to receive the prescription. If the prescription is not ready within 3 business days, please contact your clinic or your provider.     We will let you know when these medications are ready. If you don't hear back within 3 business days, please contact us.     " adalimumab 40 MG/0.8ML pen kit                Primary Care Provider Office Phone # Fax #    Trina Espinoza [phone] [address] [facility] [address]        Equal Access to Services     MICHELLE RAYMOND : Hadii celestina ku hadvidalo Soomaali, waaxda luqadaha, qaybta kaalmada adeegyada, guero suzettein hayaan leighaadriana helms karma pérez. So [facility] [phone].    ATENCIÓN: Si habla español, tiene a ramírez disposición servicios gratuitos de asistencia lingüística. Llame al [phone].    We comply with applicable federal civil rights laws and [place] laws. We do not discriminate on the basis of race, color, national origin, age, disability, sex, sexual orientation, or gender identity.            Thank you!     Thank you for choosing [facility]  for your care. Our goal is always to provide you with excellent care. Hearing back from our patients is one way we can continue to improve our services. Please take a few minutes to complete the written survey that you may receive in the mail after your visit with us. Thank you!             Your Updated Medication List - Protect others around you: Learn how to safely use, store and throw away your medicines at www.disposemymeds.org.          This list is accurate as of 8/9/18  8:55 AM.  Always use your most recent med list.                   Brand Name Dispense Instructions for use Diagnosis    adalimumab 40 MG/0.8ML pen kit    HUMIRA PEN    2 each    Inject 0.8 mLs (40 mg) Subcutaneous every 14 days ; okay to take every 3-4 weeks as long as symptoms do not return.    Reactive arthritis (H)

## 2018-08-13 LAB
GAMMA INTERFERON BACKGROUND BLD IA-ACNC: 0.03 IU/ML
M TB IFN-G BLD-IMP: NEGATIVE
M TB IFN-G CD4+ BCKGRND COR BLD-ACNC: 6.39 IU/ML
MITOGEN IGNF BCKGRD COR BLD-ACNC: 0 IU/ML
MITOGEN IGNF BCKGRD COR BLD-ACNC: 0 IU/ML

## 2018-08-20 ENCOUNTER — TELEPHONE (OUTPATIENT)
Dept: RHEUMATOLOGY | Facility: CLINIC | Age: 43
End: 2018-08-20

## 2018-08-20 NOTE — TELEPHONE ENCOUNTER
Medication Appeal Initiation    We have initiated an appeal for the requested medication:  Medication: PA denied, appeal sent  Appeal Start Date:  8/13/2018  Insurance Company:    Comments:

## 2018-08-20 NOTE — TELEPHONE ENCOUNTER
PRIOR AUTHORIZATION DENIED    Medication: PA denied, appeal sent    Denial Date: 8/10/2018    Denial Rational:     Appeal Information:

## 2018-08-22 NOTE — TELEPHONE ENCOUNTER
MEDICATION APPEAL APPROVED    Medication: CHRISTIANA ricci, Appeal Approved  Authorization Effective Date: 7/20/2018  Authorization Expiration Date: 8/20/2020  Approved Dose/Quantity:   Reference #: [id number]   Insurance Company: [employer] - Phone [phone] Fax [phone]  Expected CoPay:       CoPay Card Available:      Foundation Assistance Needed:    Which Pharmacy is filling the prescription (Not needed for infusion/clinic administered): [facility] [place] [place] [facility] [address] [facility] [address]

## 2019-08-14 DIAGNOSIS — M02.30 REACTIVE ARTHRITIS (H): ICD-10-CM

## 2019-08-14 NOTE — TELEPHONE ENCOUNTER
Rheumatology team: Please call to notify Mr. Ohara that Humira has been refilled.  Clinic follow-up is overdue and needed to safely continue Humira    Roosevelt Flores MD  8/14/2019 4:32 PM

## 2019-08-15 NOTE — TELEPHONE ENCOUNTER
Called patient to notify of refill and need for follow up appointment. Scheduled patient on 9/20/2019 for 8:20am at the [facility]. Address to clinic provided to patient.    Sonja Cueto MA

## 2019-09-20 ENCOUNTER — OFFICE VISIT (OUTPATIENT)
Dept: RHEUMATOLOGY | Facility: CLINIC | Age: 44
End: 2019-09-20
Payer: COMMERCIAL

## 2019-09-20 ENCOUNTER — TELEPHONE (OUTPATIENT)
Dept: PHARMACY | Facility: OTHER | Age: 44
End: 2019-09-20

## 2019-09-20 VITALS
HEART RATE: 60 BPM | SYSTOLIC BLOOD PRESSURE: 104 MMHG | WEIGHT: 185.4 LBS | HEIGHT: 76 IN | DIASTOLIC BLOOD PRESSURE: 62 MMHG | OXYGEN SATURATION: 95 % | BODY MASS INDEX: 22.58 KG/M2

## 2019-09-20 DIAGNOSIS — M02.30 REACTIVE ARTHRITIS (H): Primary | ICD-10-CM

## 2019-09-20 DIAGNOSIS — Z79.899 HIGH RISK MEDICATIONS (NOT ANTICOAGULANTS) LONG-TERM USE: ICD-10-CM

## 2019-09-20 LAB
ALBUMIN SERPL-MCNC: 3.8 G/DL (ref 3.4–5)
ALP SERPL-CCNC: 49 U/L (ref 40–150)
ALT SERPL W P-5'-P-CCNC: 24 U/L (ref 0–70)
AST SERPL W P-5'-P-CCNC: 21 U/L (ref 0–45)
BASOPHILS # BLD AUTO: 0 10E9/L (ref 0–0.2)
BASOPHILS NFR BLD AUTO: 0.5 %
BILIRUB DIRECT SERPL-MCNC: 0.1 MG/DL (ref 0–0.2)
BILIRUB SERPL-MCNC: 0.8 MG/DL (ref 0.2–1.3)
CREAT SERPL-MCNC: 0.89 MG/DL (ref 0.66–1.25)
CRP SERPL-MCNC: <2.9 MG/L (ref 0–8)
DIFFERENTIAL METHOD BLD: NORMAL
EOSINOPHIL # BLD AUTO: 0.3 10E9/L (ref 0–0.7)
EOSINOPHIL NFR BLD AUTO: 3.9 %
ERYTHROCYTE [DISTWIDTH] IN BLOOD BY AUTOMATED COUNT: 12.7 % (ref 10–15)
ERYTHROCYTE [SEDIMENTATION RATE] IN BLOOD BY WESTERGREN METHOD: 8 MM/H (ref 0–15)
GFR SERPL CREATININE-BSD FRML MDRD: >90 ML/MIN/{1.73_M2}
HCT VFR BLD AUTO: 42.5 % (ref 40–53)
HGB BLD-MCNC: 14.4 G/DL (ref 13.3–17.7)
LYMPHOCYTES # BLD AUTO: 2.5 10E9/L (ref 0.8–5.3)
LYMPHOCYTES NFR BLD AUTO: 37.8 %
MCH RBC QN AUTO: 31.2 PG (ref 26.5–33)
MCHC RBC AUTO-ENTMCNC: 33.9 G/DL (ref 31.5–36.5)
MCV RBC AUTO: 92 FL (ref 78–100)
MONOCYTES # BLD AUTO: 0.6 10E9/L (ref 0–1.3)
MONOCYTES NFR BLD AUTO: 8.8 %
NEUTROPHILS # BLD AUTO: 3.2 10E9/L (ref 1.6–8.3)
NEUTROPHILS NFR BLD AUTO: 49 %
PLATELET # BLD AUTO: 281 10E9/L (ref 150–450)
PROT SERPL-MCNC: 7.3 G/DL (ref 6.8–8.8)
RBC # BLD AUTO: 4.62 10E12/L (ref 4.4–5.9)
WBC # BLD AUTO: 6.5 10E9/L (ref 4–11)

## 2019-09-20 PROCEDURE — 86140 C-REACTIVE PROTEIN: CPT | Performed by: INTERNAL MEDICINE

## 2019-09-20 PROCEDURE — 85652 RBC SED RATE AUTOMATED: CPT | Performed by: INTERNAL MEDICINE

## 2019-09-20 PROCEDURE — 80076 HEPATIC FUNCTION PANEL: CPT | Performed by: INTERNAL MEDICINE

## 2019-09-20 PROCEDURE — 99213 OFFICE O/P EST LOW 20 MIN: CPT | Performed by: INTERNAL MEDICINE

## 2019-09-20 PROCEDURE — 85025 COMPLETE CBC W/AUTO DIFF WBC: CPT | Performed by: INTERNAL MEDICINE

## 2019-09-20 PROCEDURE — 36415 COLL VENOUS BLD VENIPUNCTURE: CPT | Performed by: INTERNAL MEDICINE

## 2019-09-20 PROCEDURE — 82565 ASSAY OF CREATININE: CPT | Performed by: INTERNAL MEDICINE

## 2019-09-20 ASSESSMENT — MIFFLIN-ST. JEOR: SCORE: 1829.53

## 2019-09-20 NOTE — PROGRESS NOTES
Rheumatology Clinic Visit      Viet Ohara MRN# [id number]   YOB: 1975 Age: 44 year old      Date of visit: 9/20/19   PCP: Dr. Trina Espinoza    Chief Complaint   Patient presents with:  Arthritis: Reactive arthritis. Humira is working    Assessment and Plan     1. Reactive arthritis: From chart review, he was in [place] where he developed a gastrointestinal illness and was later diagnosed with reactive arthritis with dactylitis in 2012. Per the patient, previous treatments include prednisone (ineffective), methotrexate (ineffective), sulfasalazine (ineffective), then Humira that was very effective. He discontinued Humira because he was doing well and all of the symptoms returned. Asymptomatic after restarting Humira. Currently doing very well. He is taking Humira every 3 weeks, as mild symptoms return if he waits 4 weeks. Note that symptoms did return when held for 6 weeks per patient report, showing that Humira is effective.  Never had systemic symptoms such as fatigue.      - Continue Humira 40 mg subcutaneous every 21 days (change to citrate free formulation)  - Labs today: CBC, Cr, Hepatic Panel, ESR, CRP    2.  Vaccinations: Vaccinations reviewed with Mr. Ohara.  Risks and benefits of vaccinations were discussed.   CDC stance on shingrix when on moderate to high immunosuppression was reviewed.   I explained that Shingrix is used off label when under 50 years old and that the safety and efficacy of the vaccine has not been tested in people younger than 50 years old.   - Influenza: refused by patient  - Irwlpxd20: up to date  - Milzenbxo86: needs updating; refused today  - Shingrix: Advised receiving; patient is going to check on insurance coverage before determining if it is going to be received    Mr. Ohara verbalized agreement with and understanding of the rational for the diagnosis and treatment plan.  All questions were answered to best of my ability and the patient's satisfaction.   Lev was advised to contact the clinic with any questions that may arise after the clinic visit.      Thank you for involving me in the care of the patient    Return to clinic: I instructed Mr. Ohara to call to schedule a follow-up appointment to be in August 2020      HPI   Viet Ohara is a 44 year old male with a medical history significant for reactive arthritis, rupture of plantaris tendon, and history of trigger finger who presents for follow-up of reactive arthritis.    5/17/2016: no show to scheduled clinic visit    6/14/2016: Mr. Ohara reported that prednisone was effective in the past but NSAIDs, methotrexate, and sulfasalazine were ineffective. Humira has been very effective. He discontinued Humira one point and all the symptoms returned. Later, he started taking Humira every 3 weeks instead of every 2 weeks and his symptoms returned.    5/16/2017: no show to scheduled clinic visit    9/14/2017: evaluated in clinic    8/9/2018: he reported doing very well with no joint pain when taking Humira at least every 4 weeks.  He tried changing the Humira to be every 3 weeks and felt well so changed Humira to every 4 weeks with no worsening symptoms.  He did not take humira for 6 weeks once and had more pain in his left hand that resolved with taking the next Humira dose.       8/8/2019: no show to scheduled clinic visit    Today, 9/20/2019: Mr. Ohara reports that he is doing well on Humira every 21 days.  If he waits that Humira every 4 weeks and has return of symptoms before the next shot.  Happy with how well he is doing and does not want to change anything.  No joint swelling or pain at this time.  No morning stiffness.    Denies fevers, chills, nausea, vomiting, constipation, diarrhea. No abdominal pain. No chest pain/pressure, palpitations, or shortness of breath. No oral or nasal sores. No neck pain. No rash. No LE swelling.    Tobacco: none  EtOH: 1 beer daily  Drugs: none  Occupation: Operates the  camera for [employer]    ROS   GEN: No fevers, chills, night sweats, fatigue, or weight change  SKIN: No itching, rashes, sores  HEENT:  No epistaxis. No oral or nasal ulcers.  CV: No chest pain, pressure, palpitations, or dyspnea on exertion.  PULM: No SOB, wheeze, cough.  GI: No nausea, vomiting, constipation, diarrhea. No blood in stool. No abdominal pain.  : No blood in urine.  MSK: See HPI.  NEURO: No numbness, tingling, or weakness.  EXT: No LE swelling  PSYCH: negative    Active Problem List     Patient Active Problem List   Diagnosis     CARDIOVASCULAR SCREENING; LDL GOAL LESS THAN 160     Trigger finger     Reactive arthritis (H)     Encounter for long-term current use of medication     Rupture of plantaris tendon     Baker's cyst of knee     High risk medication use     Past Medical History     Past Medical History:   Diagnosis Date     CARDIOVASCULAR SCREENING; LDL GOAL LESS THAN 160 10/31/2010     Reactive arthritis (H)      Trigger finger     Bilateral Hands that require periodic steroid shots     Past Surgical History     Past Surgical History:   Procedure Laterality Date     C NONSPECIFIC PROCEDURE      multiple hand/ortho for trigger finger release x 4     Allergy     Allergies   Allergen Reactions     Nkda [No Known Drug Allergies]      Current Medication List     Current Outpatient Medications   Medication Sig     adalimumab (HUMIRA *CF*) 40 MG/0.4ML pen kit Inject 0.4 mLs (40 mg) Subcutaneous every 21 days . Call [phone] to schedule a follow-up to be in August 2020.     No current facility-administered medications for this visit.      Social History   See HPI    Family History     Family History   Problem Relation Age of Onset     Cancer Paternal Grandfather         lung +tob     C.A.D. No family hx of      Diabetes No family hx of      Hypertension No family hx of      Cerebrovascular Disease No family hx of      Connective Tissue Disorder No family hx of      Physical Exam     Temp  "Readings from Last 3 Encounters:   09/14/17 97.3  F (36.3  C) (Oral)   11/16/16 97.7  F (36.5  C)   06/14/16 98.3  F (36.8  C) (Oral)     BP Readings from Last 5 Encounters:   09/20/19 104/62   08/09/18 113/66   09/14/17 104/60   11/16/16 102/64   06/14/16 116/68     Pulse Readings from Last 1 Encounters:   09/20/19 60     Resp Readings from Last 1 Encounters:   08/09/18 14     Estimated body mass index is 22.87 kg/m  as calculated from the following:    Height as of this encounter: 1.918 m (6' 3.5\").    Weight as of this encounter: 84.1 kg (185 lb 6.4 oz).    GEN: NAD  HEENT: MMM. No oral lesions. Anicteric, noninjected sclera  CV: S1, S2. RRR. No m/r/g.  PULM: CTA bilaterally. No w/c.  MSK:  Hands, wrists, elbows, shoulders, knees, ankles, and MTPs without swelling or tenderness to palpation. Hips nontender to direct palpation. Achilles tendons nontender to palpation. No dactylitis.    NEURO: UE and LE strengths 5/5 and equal bilaterally.   SKIN: No rash  EXT: No LE edema  PSYCH: Alert. Appropriate.    Labs / Imaging (select studies)   RF/CCP  Recent Labs   Lab Test 09/25/12  1411 06/26/12  1030 06/05/12  1059   CCPABY <20 Interpretation:  Negative  --  <20 Interpretation:  Negative   RHF 9 7  --      CBC  Recent Labs   Lab Test 08/09/18  0902 09/14/17  0920 11/16/16  0949   WBC 5.7 5.6 5.7   RBC 4.48 4.53 4.50   HGB 14.3 14.8 14.5   HCT 41.6 42.0 41.9   MCV 93 93 93   RDW 12.9 12.9 12.5    278 269   MCH 31.9 32.7 32.2   MCHC 34.4 35.2 34.6   NEUTROPHIL 45.7 42.9 51.7   LYMPH 41.2 44.4 36.5   MONOCYTE 8.8 9.1 8.7   EOSINOPHIL 3.9 3.2 2.8   BASOPHIL 0.4 0.4 0.3   ANEU 2.6 2.4 3.0   ALYM 2.4 2.5 2.1   BALWINDER 0.5 0.5 0.5   AEOS 0.2 0.2 0.2   ABAS 0.0 0.0 0.0     CMP  Recent Labs   Lab Test 08/09/18  0902 09/14/17  0920 06/14/16  1419  12/12/14  1052  10/11/12  04/06/12  1424   NA  --   --  138  --  136  --  138  --  140   POTASSIUM  --   --  3.9  --  4.2  --  4.1  --  4.4   CHLORIDE  --   --  105  --  102  --  " 104  --  99   CO2  --   --  24  --  29  --   --   --  26   ANIONGAP  --   --  9  --  5  --   --   --  15   GLC  --   --  90  --  81  --  96  --  103*   BUN  --   --  11  --  15  --  16  --  15   CR 0.95 0.83 0.82  --  1.02   < > 0.9   < > 0.82   GFRESTIMATED 87 >90 >90  Non African American GFR Calc    --  81   < > >60   < > >90   GFRESTBLACK >90 >90 >90  African American GFR Calc    --  >90   GFR Calc     < >  --    < > >90   LULU  --  8.8 8.4*  --  8.8  --  9.5  --  8.8   BILITOTAL 1.3 1.2 1.4*   < > 1.0  --  1.4  --  0.6   ALBUMIN 3.8 3.8 3.9   < > 4.1   < > 3.5  --  4.0   PROTTOTAL 7.4 7.4 7.8   < > 7.9  --  7.4  --  8.2   ALKPHOS 51 50 53   < > 63  --  346*   < > 73   AST 23 20 24   < > 25   < > 83*   < > 33   ALT 25 30 28   < > 43   < > 122*  --  19    < > = values in this interval not displayed.     Calcium/VitaminD  Recent Labs   Lab Test 09/14/17  0920 06/14/16  1419 12/12/14  1052   LULU 8.8 8.4* 8.8   VITDT 33  --   --      ESR/CRP  Recent Labs   Lab Test 08/09/18  0902 12/12/14  1052 11/19/12  0852 09/25/12  1411   SED 7  --  10 13   CRP <2.9 <2.9 5.3 6.2     Lipid Panel  Recent Labs   Lab Test 10/11/12   CHOL 277*   TRIG 99   HDL 63   *   VLDL 19   CHOLHDLRATIO 4.4     Hepatitis B  Recent Labs   Lab Test 06/14/16  1419 05/08/15  0928 01/17/13  1554   HBCAB Nonreactive  --   --    HEPBANG Nonreactive Nonreactive Negative     Hepatitis C  Recent Labs   Lab Test 06/14/16  1419 05/08/15  0928 01/17/13  1554   HCVAB Nonreactive   Assay performance characteristics have not been established for newborns,   infants, and children   Nonreactive   Assay performance characteristics have not been established for newborns,   infants, and children   Negative     Tuberculosis Screening  Recent Labs   Lab Test 08/09/18  0902 06/14/16  1418 05/08/15  0930 01/17/13  1553   TBRES Negative  --   --   --    TBRSLT  --  Indeterminate   Results are indeterminate, possible impaired immune response.   Consider   submitting a repeat sample in 1 to 2 months.  * Negative Negative   TBAGN  --  0.00 0.01 0.03     HIV Screening  Recent Labs   Lab Test 06/14/16  1419   HIAGAB Nonreactive   HIV-1 p24 Ag & HIV-1/HIV-2 Ab Not Detected       Immunization History     Immunization History   Administered Date(s) Administered     Influenza (IIV3) PF 11/15/2011     Pneumo Conj 13-V (2010&after) 09/14/2017     Pneumococcal 23 valent 01/17/2013     TDAP Vaccine (Adacel) 02/20/2015          Chart documentation done in part with Dragon Voice recognition Software. Although reviewed after completion, some word and grammatical error may remain.    Roosevelt Flores MD

## 2019-09-20 NOTE — NURSING NOTE
RAPID3 (0-30) Cumulative Score  3.0          RAPID3 Weighted Score (divide #4 by 3 and that is the weighted score)  1.0       [facility] Rheumatology  9/20/2019 8:36 AM

## 2019-09-20 NOTE — PATIENT INSTRUCTIONS
For the [facility] assistance program: 1.800.4HUMIRA ([phone])    Check on insurance coverage for the shingles vaccine (shingrix)    Call [phone] to schedule a rheumatology follow-up appointment to be in August 2020.    Rheumatology    Dr. Roosevelt Flores         [facility]   (Monday)  [address]  [place] [address]       [facility]   (Tuesday)  [address] [place] [address]    [facility]   (Wed., Thurs., and Friday)  [address] [place] [address]    Phone number: [phone]  Thank you for choosing [facility].  Ira Mosqueda CMA

## 2020-02-23 ENCOUNTER — HEALTH MAINTENANCE LETTER (OUTPATIENT)
Age: 45
End: 2020-02-23

## 2020-08-05 ENCOUNTER — TELEPHONE (OUTPATIENT)
Dept: RHEUMATOLOGY | Facility: CLINIC | Age: 45
End: 2020-08-05

## 2020-08-05 NOTE — TELEPHONE ENCOUNTER
PA Initiation    Medication: Humira  Insurance Company: [employer] - Phone [phone] Fax [phone]  Pharmacy Filling the Rx: [facility] [place] [address]  Filling Pharmacy Phone: [phone]  Filling Pharmacy Fax:    Start Date: 8/5/2020    Central Prior Authorization Team   Phone: [phone]

## 2020-08-05 NOTE — TELEPHONE ENCOUNTER
Received fax from pharmacy stating patient requires Prior Authorization for Humira      Insurance information:   Name:   Phone number:   ID number:     Initiate prior authorization or change medication?    If a prior authorization is to be initiated, please list the following:    -any medications the patient has tried and failed or any contraindications.  -is the patient currently on this medication, or has tried before?  -What is the diagnosis?  -Justification or other information that may be helpful.     Ira Mosqueda CMA Rheumatology  8/5/2020 11:03 AM

## 2020-08-12 NOTE — TELEPHONE ENCOUNTER
Prior Authorization Approval    Authorization Effective Date: 8/7/2020  Authorization Expiration Date: 8/7/2021  Medication: Humira  Approved Dose/Quantity: 40mg  Reference #:   [id number]  Insurance Company: [employer] - Phone [phone] Fax [phone]  Which Pharmacy is filling the prescription (Not needed for infusion/clinic administered): [facility]  Pharmacy Notified: Yes  Patient Notified:  **Instructed pharmacy to notify patient when script is ready to /ship.**

## 2020-09-11 DIAGNOSIS — M02.30 REACTIVE ARTHRITIS (H): ICD-10-CM

## 2020-09-11 NOTE — TELEPHONE ENCOUNTER
RN: Please call to notify Mr. Ohara that Humira has been refilled.  Rheumatology follow-up appointment required prior to next refill.   Roosevelt Flores MD  9/11/2020 4:04 PM

## 2020-09-11 NOTE — TELEPHONE ENCOUNTER
Medication:   Humira  Last written on:   9/20/2019  Quantity:   1.6ml    Refills:   4    Last office visit:   9/20/2019  Next office visit:     Last labs:   9/20/2019    Ira Mosqueda CMA Rheumatology  9/11/2020 7:28 AM

## 2020-09-15 NOTE — TELEPHONE ENCOUNTER
Called patient and left a detailed message informing patient that Humira was refilled and a follow up apt is required for additional refills.    (consent to leave message in demographics)    Ren Shetty RN....9/15/2020 8:52 AM

## 2021-03-01 ENCOUNTER — TELEPHONE (OUTPATIENT)
Dept: RHEUMATOLOGY | Facility: CLINIC | Age: 46
End: 2021-03-01

## 2021-03-01 NOTE — PROGRESS NOTES
Viet Ohara  is a 46 year old year old male who is being evaluated via a billable video visit.      How would you like to obtain your AVS? Mail  If the video visit is dropped, the invitation should be resent by: Text to cell phone: [phone]  Will anyone else be joining your video visit? No    Rheumatology Video Visit      Viet Ohara MRN# [id number]   YOB: 1975 Age: 46 year old      Date of visit: 3/02/21   PCP: Dr. Trina Espinoza    Chief Complaint   Patient presents with:  Reactive arthritis    Assessment and Plan     1. Reactive arthritis: From chart review, he was in [place] where he developed a gastrointestinal illness and was later diagnosed with reactive arthritis with dactylitis in 2012. Per the patient, previous treatments include prednisone (ineffective), methotrexate (ineffective), sulfasalazine (ineffective), then Humira that was very effective.  At one point he discontinued Humira because he was doing well and all of the symptoms returned so he restarted Humira with resolution of the symptoms.  Currently taking Humira every 3 weeks, with mild symptoms returning if he extends the interval beyond this.  Never had systemic symptoms such as fatigue.     Chronic illness  - Continue Humira 40 mg subcutaneous every 21 days (change to citrate free formulation)  - Labs now: CBC, Cr, Hepatic Panel, ESR, CRP, QuantiFERON-TB gold plus    2.  Right thumb pain: Suspect early trigger finger.  He says that trigger fingers were a presentation of his reactive arthritis in the past.  Discussed steroid injection for this issue and he says that steroid injections have been helpful in the past but he prefers to avoid them because of the associated pain with the injection itself.  Discussed using NSAIDs first and he is agreeable to this.  Therefore, use ibuprofen 800 mg 3 times daily with food for duration of 2 weeks only and if symptoms do not resolve then he is to contact this clinic for an-clinic  evaluation to consider steroid injection.  New issue    Follow-up required at least once yearly.    # At this time the COVID-19 vaccine (currently available from [employer] and [employer]) is recommended based on our knowledge of this vaccine and vaccines in the past.  Based on the data for the mRNA COVID-19 vaccines available in the [place], there is no preference for one COVID-19 vaccine over another. Note that there is no data currently available to specifically establish safety and efficacy for this vaccine in immunocompromised people.      # This is a virtual visit to reduce the risk of COVID-19 exposure during this current pandemic.      # Considered to be at high risk of complications from the COVID-19 virus.  It is recommended to limit contact with other people and if possible to work remotely or provide a leave of absence to reduce the risk for COVID-19.      Total minutes spent in evaluation with patient, documentation, , and review of pertinent studies and chart notes: 34       Mr. Ohara verbalized agreement with and understanding of the rational for the diagnosis and treatment plan.  All questions were answered to best of my ability and the patient's satisfaction. Mr. Ohara was advised to contact the clinic with any questions that may arise after the clinic visit.      Thank you for involving me in the care of the patient    Return to clinic: 1 year, sooner if needed      HPI   Viet Ohara is a 46 year old male with a medical history significant for reactive arthritis, rupture of plantaris tendon, and history of trigger finger who presents for follow-up of reactive arthritis.    5/17/2016: no show to scheduled clinic visit    6/14/2016: Mr. Ohara reported that prednisone was effective in the past but NSAIDs, methotrexate, and sulfasalazine were ineffective. Humira has been very effective. He discontinued Humira one point and all the symptoms returned. Later, he started taking Humira  every 3 weeks instead of every 2 weeks and his symptoms returned.    5/16/2017: no show to scheduled clinic visit    9/14/2017: evaluated in clinic    8/9/2018: he reported doing very well with no joint pain when taking Humira at least every 4 weeks.  He tried changing the Humira to be every 3 weeks and felt well so changed Humira to every 4 weeks with no worsening symptoms.  He did not take humira for 6 weeks once and had more pain in his left hand that resolved with taking the next Humira dose.       8/8/2019: no show to scheduled clinic visit    9/20/2019: Mr. Ohara reports that he is doing well on Humira every 21 days.  If he waits that Humira every 4 weeks and has return of symptoms before the next shot.  Happy with how well he is doing and does not want to change anything.  No joint swelling or pain at this time.  No morning stiffness.    2/10/2021: no show to scheduled appointment    Today, 3/2/2021: Currently taking Humira q3 weeks and if delayed passes interval then he has return of symptoms.  Pain at his right thumb, where it hurts at the IP joint but if he palpates on the palmar aspect of the MCP joint near the A1 pulley then he has some tenderness and this has been an issue for about 1 month.  He says that this is similar to the trigger finger symptoms he had in the past, that was initial presentation of his reactive arthritis, per patient and injections were very helpful at that time but he prefers not to have injections because of the associated pain with the injection itself but if absolutely needed then he would be okay with it.  Has ibuprofen at home that he finds effective for headaches.    Denies fevers, chills, nausea, vomiting, constipation, diarrhea. No abdominal pain. No chest pain/pressure, palpitations, or shortness of breath. No oral or nasal sores. No neck pain. No rash. No LE swelling.    Tobacco: none  EtOH: 1 beer daily  Drugs: none  Occupation: Operates the camera for [employer]    ROS   12 point review of system was completed and negative except as noted in the HPI     Active Problem List     Patient Active Problem List   Diagnosis     CARDIOVASCULAR SCREENING; LDL GOAL LESS THAN 160     Trigger finger     Reactive arthritis (H)     Encounter for long-term current use of medication     Rupture of plantaris tendon     Baker's cyst of knee     High risk medication use     Past Medical History     Past Medical History:   Diagnosis Date     CARDIOVASCULAR SCREENING; LDL GOAL LESS THAN 160 10/31/2010     Reactive arthritis (H)      Trigger finger     Bilateral Hands that require periodic steroid shots     Past Surgical History     Past Surgical History:   Procedure Laterality Date     ZZC NONSPECIFIC PROCEDURE      multiple hand/ortho for trigger finger release x 4     Allergy     Allergies   Allergen Reactions     Nkda [No Known Drug Allergies]      Current Medication List     Current Outpatient Medications   Medication Sig     adalimumab (HUMIRA *CF*) 40 MG/0.4ML pen kit Inject 0.4 mLs (40 mg) Subcutaneous every 21 days . Rheumatology follow-up required prior to next refill.     No current facility-administered medications for this visit.      Social History   See HPI    Family History     Family History   Problem Relation Age of Onset     Cancer Paternal Grandfather         lung +tob     C.A.D. No family hx of      Diabetes No family hx of      Hypertension No family hx of      Cerebrovascular Disease No family hx of      Connective Tissue Disorder No family hx of      Physical Exam     Temp Readings from Last 3 Encounters:   09/14/17 97.3  F (36.3  C) (Oral)   11/16/16 97.7  F (36.5  C)   06/14/16 98.3  F (36.8  C) (Oral)     BP Readings from Last 5 Encounters:   09/20/19 104/62   08/09/18 113/66   09/14/17 104/60   11/16/16 102/64   06/14/16 116/68     Pulse Readings from Last 1 Encounters:   09/20/19 60     Resp Readings from Last 1 Encounters:   08/09/18 14     Estimated body mass  "index is 22.87 kg/m  as calculated from the following:    Height as of 9/20/19: 1.918 m (6' 3.5\").    Weight as of 9/20/19: 84.1 kg (185 lb 6.4 oz).    GEN: NAD  HEENT: MMM. No oral lesions. Anicteric, noninjected sclera  CV: S1, S2. RRR. No m/r/g.  PULM: CTA bilaterally. No w/c.  MSK:  Hands, wrists, elbows, shoulders, knees, ankles, and MTPs without swelling or tenderness to palpation. Hips nontender to direct palpation. Achilles tendons nontender to palpation. No dactylitis.    NEURO: UE and LE strengths 5/5 and equal bilaterally.   SKIN: No rash  EXT: No LE edema  PSYCH: Alert. Appropriate.    Labs / Imaging (select studies)       CBC  Recent Labs   Lab Test 09/20/19  0900 08/09/18  0902 09/14/17  0920   WBC 6.5 5.7 5.6   RBC 4.62 4.48 4.53   HGB 14.4 14.3 14.8   HCT 42.5 41.6 42.0   MCV 92 93 93   RDW 12.7 12.9 12.9    273 278   MCH 31.2 31.9 32.7   MCHC 33.9 34.4 35.2   NEUTROPHIL 49.0 45.7 42.9   LYMPH 37.8 41.2 44.4   MONOCYTE 8.8 8.8 9.1   EOSINOPHIL 3.9 3.9 3.2   BASOPHIL 0.5 0.4 0.4   ANEU 3.2 2.6 2.4   ALYM 2.5 2.4 2.5   BALWINDER 0.6 0.5 0.5   AEOS 0.3 0.2 0.2   ABAS 0.0 0.0 0.0     CMP  Recent Labs   Lab Test 09/20/19  0900 08/09/18  0902 09/14/17  0920 06/14/16  1419 12/12/14  1052 12/12/14  1052   NA  --   --   --  138  --  136   POTASSIUM  --   --   --  3.9  --  4.2   CHLORIDE  --   --   --  105  --  102   CO2  --   --   --  24  --  29   ANIONGAP  --   --   --  9  --  5   GLC  --   --   --  90  --  81   BUN  --   --   --  11  --  15   CR 0.89 0.95 0.83 0.82  --  1.02   GFRESTIMATED >90 87 >90 >90  Non African American GFR Calc    --  81   GFRESTBLACK >90 >90 >90 >90  African American GFR Calc    --  >90   GFR Calc     LULU  --   --  8.8 8.4*  --  8.8   BILITOTAL 0.8 1.3 1.2 1.4*   < > 1.0   ALBUMIN 3.8 3.8 3.8 3.9   < > 4.1   PROTTOTAL 7.3 7.4 7.4 7.8   < > 7.9   ALKPHOS 49 51 50 53   < > 63   AST 21 23 20 24   < > 25   ALT 24 25 30 28   < > 43    < > = values in this interval not " displayed.     Calcium/VitaminD  Recent Labs   Lab Test 09/14/17  0920 06/14/16  1419 12/12/14  1052   LULU 8.8 8.4* 8.8   VITDT 33  --   --      ESR/CRP  Recent Labs   Lab Test 09/20/19  0900 08/09/18  0902 12/12/14  1052   SED 8 7  --    CRP <2.9 <2.9 <2.9     Hepatitis B  Recent Labs   Lab Test 06/14/16  1419 05/08/15  0928 01/17/13  1554   HBCAB Nonreactive  --   --    HEPBANG Nonreactive Nonreactive Negative     Hepatitis C  Recent Labs   Lab Test 06/14/16  1419 05/08/15  0928 01/17/13  1554   HCVAB Nonreactive   Assay performance characteristics have not been established for newborns,   infants, and children   Nonreactive   Assay performance characteristics have not been established for newborns,   infants, and children   Negative     Tuberculosis Screening  Recent Labs   Lab Test 08/09/18  0902 06/14/16  1418 05/08/15  0930 01/17/13  1553   TBRES Negative  --   --   --    TBRSLT  --  Indeterminate   Results are indeterminate, possible impaired immune response.  Consider   submitting a repeat sample in 1 to 2 months.  * Negative Negative   TBAGN  --  0.00 0.01 0.03     HIV Screening  Recent Labs   Lab Test 06/14/16  1419   HIAGAB Nonreactive   HIV-1 p24 Ag & HIV-1/HIV-2 Ab Not Detected       Immunization History     Immunization History   Administered Date(s) Administered     Influenza (IIV3) PF 11/15/2011     Pneumo Conj 13-V (2010&after) 09/14/2017     Pneumococcal 23 valent 01/17/2013     TDAP Vaccine (Adacel) 02/20/2015          Chart documentation done in part with Dragon Voice recognition Software. Although reviewed after completion, some word and grammatical error may remain.      Video-Visit Details    Type of service:  Video Visit    Video Start Time: 9:46 AM    Video End Time:10:06 AM    Originating Location (pt. Location): [place], [place]    Distant Location (provider location):  Home    Platform used for Video Visit: Lexy Flores MD

## 2021-03-01 NOTE — PATIENT INSTRUCTIONS
RHEUMATOLOGY    Dr. Roosevelt Flores    [facility]  [address] [address]    Our new phone number for Rheumatology is [phone], this number will be able to help you schedule appointments for Dr. Flores or if you have any message you would like sent to us.    Thank you for choosing [facility]!  Ira Mosqueda [facility] Rheumatology

## 2021-03-01 NOTE — TELEPHONE ENCOUNTER
Called and spoke with patient, scheduled patient for 3/2/2021.  /Ira Mosqueda CMA Rheumatology  3/1/2021 12:05 PM

## 2021-03-01 NOTE — TELEPHONE ENCOUNTER
Health Call Center    Phone Message    May a detailed message be left on voicemail: yes     Reason for Call: Other: Follow up appointment and Med Refill    Pt had missed his appointment in February due to some family issues.  Pt is calling to rescheduled a video visit with Dr. Flores.  Next soonest available was 5/27/21.  Pt did schedule for 5/27/21 but he is wondering if Dr. Flores is able to get him in sooner somewhere? Pt needs his Humira refilled.      Or Pt is wondering if Dr. Flores is able to approve refills until he can be seen on 5/27/21? Or any other suggestions?    If okay for refill, please send refill to [facility] specialty pharmacy.    Action Taken: Other: [facility] Rhe[facility]    Travel Screening: Not Applicable

## 2021-03-02 ENCOUNTER — VIRTUAL VISIT (OUTPATIENT)
Dept: RHEUMATOLOGY | Facility: CLINIC | Age: 46
End: 2021-03-02
Payer: COMMERCIAL

## 2021-03-02 DIAGNOSIS — M79.644 PAIN OF RIGHT THUMB: ICD-10-CM

## 2021-03-02 DIAGNOSIS — M02.30 REACTIVE ARTHRITIS (H): Primary | ICD-10-CM

## 2021-03-02 PROCEDURE — 99214 OFFICE O/P EST MOD 30 MIN: CPT | Mod: GT | Performed by: INTERNAL MEDICINE

## 2021-03-03 ENCOUNTER — TELEPHONE (OUTPATIENT)
Dept: RHEUMATOLOGY | Facility: CLINIC | Age: 46
End: 2021-03-03

## 2021-03-03 NOTE — TELEPHONE ENCOUNTER
PA Initiation    Medication: Humira- New insurance  Insurance Company: [employer] ([facility]) - Phone [phone] Fax [phone]  Pharmacy Filling the Rx: [facility] - [place], [place] - [facility] [address] OR [facility]-unable to determine until PA approved  Filling Pharmacy Phone:    Filling Pharmacy Fax:    Start Date: 3/3/2021      Unable to submit on CMM, had to call in the prior auth:

## 2021-03-03 NOTE — TELEPHONE ENCOUNTER
Prior Authorization Approval    Authorization Effective Date: 3/3/2021  Authorization Expiration Date: 3/3/2022  Medication: Humira- New insurance  Approved Dose/Quantity: 2/28ds  Reference #: [id number]   Insurance Company: [employer] ([facility]) - Phone [phone] Fax [phone]  Expected CoPay:       CoPay Card Available: Yes    Foundation Assistance Needed:    Which Pharmacy is filling the prescription (Not needed for infusion/clinic administered): [facility] - [place], [place] - [address]   Pharmacy Notified: Yes  Patient Notified: Yes  Per call to Eladio VILLEDA help desk, he can fill at [facility].

## 2021-08-05 ENCOUNTER — TELEPHONE (OUTPATIENT)
Dept: RHEUMATOLOGY | Facility: CLINIC | Age: 46
End: 2021-08-05

## 2021-08-05 DIAGNOSIS — M02.30 REACTIVE ARTHRITIS (H): ICD-10-CM

## 2021-08-05 NOTE — TELEPHONE ENCOUNTER
Called and spoke with patient who reports right-sided thumb pain which started 2 months ago and is getting progressively worse. He has reduced  strength in his thumb and has a hard time picking up and grasping things. Pain worsens with movement. Thumb is pinker than the other thumb. He feels it is related to his arthritis. Ibuprofen provides some relief. Patient is taking Humira 40 mg every 21 days and said he used to take it every 14 days. He thinks he might need to switch back to taking it every 14 days and is unsure whether he needs an appointment for this. He also said there were some labs ordered at his last visit which he never got done. He is wondering if he can still get those labs done now. I extended the  lab orders and scheduled a lab apt on 21. Forwarded message to Dr. Flores for review.    (detailed message is okay)    Ren SEN RN....2021 12:45 PM

## 2021-08-05 NOTE — TELEPHONE ENCOUNTER
Medication:   Humira  Last written on:   3/2/2021  Quantity:   0.8ml    Refills:   1    Last office visit:   3/2/2021  Next office visit:   11/19/2021  Last labs:   9/20/2019 (has a lab appointment scheduled for 8/17/2021)

## 2021-08-05 NOTE — TELEPHONE ENCOUNTER
[facility] Call Center    Phone Message    May a detailed message be left on voicemail: yes     Reason for Call: Symptoms or Concerns     If patient has red-flag symptoms, warm transfer to triage line    Current symptom or concern: Pain in right thumb     Symptoms have been present for:  2 month(s)    Has patient previously been seen for this? Yes    By Dr Flores:    Date: last appointment was in March 2021    Are there any new or worsening symptoms? Yes: Pain is getting worse as the days go on.     Action Taken: Message routed to:  Other: [facility]     Travel Screening: Not Applicable

## 2021-08-06 NOTE — TELEPHONE ENCOUNTER
Rheumatology team: Please call to notify Mr. Ohara that Humira has been refilled.  Labs are needed; please assist him with scheduling a lab appointment.  Roosevelt Flores MD  8/6/2021 2:10 PM

## 2021-08-06 NOTE — TELEPHONE ENCOUNTER
RN: Please call to notify Viet Ohara that it is okay to change Humira to be every 14 days. I would need to evaluate him in clinic to advise on the thumb pain. Okay to schedule an appointment if needed.    Roosevelt Flores MD  8/6/2021 2:45 PM

## 2021-08-06 NOTE — TELEPHONE ENCOUNTER
Called and advised patient that he may start Humira every 14 days. Patient verbalized understanding. He has a follow up scheduled on 11/9/21 and would like to be contacted if a sooner opening arises.    Ren SEN RN....8/6/2021 3:11 PM

## 2021-08-17 ENCOUNTER — LAB (OUTPATIENT)
Dept: LAB | Facility: CLINIC | Age: 46
End: 2021-08-17
Payer: COMMERCIAL

## 2021-08-17 DIAGNOSIS — M02.30 REACTIVE ARTHRITIS (H): ICD-10-CM

## 2021-08-17 LAB
ALBUMIN SERPL-MCNC: 3.9 G/DL (ref 3.4–5)
ALP SERPL-CCNC: 48 U/L (ref 40–150)
ALT SERPL W P-5'-P-CCNC: 37 U/L (ref 0–70)
AST SERPL W P-5'-P-CCNC: 25 U/L (ref 0–45)
BASOPHILS # BLD AUTO: 0 10E3/UL (ref 0–0.2)
BASOPHILS NFR BLD AUTO: 0 %
BILIRUB DIRECT SERPL-MCNC: 0.2 MG/DL (ref 0–0.2)
BILIRUB SERPL-MCNC: 1.5 MG/DL (ref 0.2–1.3)
CREAT SERPL-MCNC: 0.92 MG/DL (ref 0.66–1.25)
CRP SERPL-MCNC: <2.9 MG/L (ref 0–8)
EOSINOPHIL # BLD AUTO: 0.1 10E3/UL (ref 0–0.7)
EOSINOPHIL NFR BLD AUTO: 2 %
ERYTHROCYTE [DISTWIDTH] IN BLOOD BY AUTOMATED COUNT: 12.5 % (ref 10–15)
ERYTHROCYTE [SEDIMENTATION RATE] IN BLOOD BY WESTERGREN METHOD: 8 MM/HR (ref 0–15)
GFR SERPL CREATININE-BSD FRML MDRD: >90 ML/MIN/1.73M2
HCT VFR BLD AUTO: 46.1 % (ref 40–53)
HGB BLD-MCNC: 15.5 G/DL (ref 13.3–17.7)
LYMPHOCYTES # BLD AUTO: 2.1 10E3/UL (ref 0.8–5.3)
LYMPHOCYTES NFR BLD AUTO: 36 %
MCH RBC QN AUTO: 31.6 PG (ref 26.5–33)
MCHC RBC AUTO-ENTMCNC: 33.6 G/DL (ref 31.5–36.5)
MCV RBC AUTO: 94 FL (ref 78–100)
MONOCYTES # BLD AUTO: 0.5 10E3/UL (ref 0–1.3)
MONOCYTES NFR BLD AUTO: 9 %
NEUTROPHILS # BLD AUTO: 3 10E3/UL (ref 1.6–8.3)
NEUTROPHILS NFR BLD AUTO: 52 %
PLATELET # BLD AUTO: 283 10E3/UL (ref 150–450)
PROT SERPL-MCNC: 8 G/DL (ref 6.8–8.8)
RBC # BLD AUTO: 4.91 10E6/UL (ref 4.4–5.9)
WBC # BLD AUTO: 5.8 10E3/UL (ref 4–11)

## 2021-08-17 PROCEDURE — 80076 HEPATIC FUNCTION PANEL: CPT

## 2021-08-17 PROCEDURE — 85652 RBC SED RATE AUTOMATED: CPT

## 2021-08-17 PROCEDURE — 82565 ASSAY OF CREATININE: CPT

## 2021-08-17 PROCEDURE — 36415 COLL VENOUS BLD VENIPUNCTURE: CPT

## 2021-08-17 PROCEDURE — 85025 COMPLETE CBC W/AUTO DIFF WBC: CPT

## 2021-08-17 PROCEDURE — 86140 C-REACTIVE PROTEIN: CPT

## 2021-08-17 PROCEDURE — 86481 TB AG RESPONSE T-CELL SUSP: CPT

## 2021-08-18 LAB
QUANTIFERON MITOGEN: 10 IU/ML
QUANTIFERON NIL TUBE: 0.01 IU/ML
QUANTIFERON TB1 TUBE: 0.06 IU/ML
QUANTIFERON TB2 TUBE: 0.08

## 2021-08-18 NOTE — TELEPHONE ENCOUNTER
Called patient to see if he could come in to clinic on Thursday August 19 th at 12:40, patient is unable to come in at that time. Will watch for other openings.  Ira Mosqueda [facility] Rheumatology  8/18/2021 4:50 PM

## 2021-08-19 LAB
GAMMA INTERFERON BACKGROUND BLD IA-ACNC: 0.01 IU/ML
M TB IFN-G BLD-IMP: NEGATIVE
M TB IFN-G CD4+ BCKGRND COR BLD-ACNC: 9.99 IU/ML
MITOGEN IGNF BCKGRD COR BLD-ACNC: 0.05 IU/ML
MITOGEN IGNF BCKGRD COR BLD-ACNC: 0.07 IU/ML

## 2021-09-24 NOTE — TELEPHONE ENCOUNTER
Appt opened on 9/30/2021 at 7:20 am. Called patient to offer appointment. Patient declined. Rosangela James RN, BSN Specialty Clinics

## 2021-10-15 NOTE — TELEPHONE ENCOUNTER
Pt has been offered multiple appts and has declined. Staff will no long watch for opening.    Patricia BARKER RN Specialty Triage 10/15/2021 1:20 PM

## 2021-11-19 ENCOUNTER — OFFICE VISIT (OUTPATIENT)
Dept: RHEUMATOLOGY | Facility: CLINIC | Age: 46
End: 2021-11-19
Payer: COMMERCIAL

## 2021-11-19 VITALS
HEART RATE: 69 BPM | DIASTOLIC BLOOD PRESSURE: 75 MMHG | WEIGHT: 188.8 LBS | OXYGEN SATURATION: 98 % | BODY MASS INDEX: 23.29 KG/M2 | SYSTOLIC BLOOD PRESSURE: 113 MMHG

## 2021-11-19 DIAGNOSIS — Z79.899 HIGH RISK MEDICATION USE: ICD-10-CM

## 2021-11-19 DIAGNOSIS — Z23 NEED FOR PROPHYLACTIC VACCINATION AND INOCULATION AGAINST INFLUENZA: ICD-10-CM

## 2021-11-19 DIAGNOSIS — M02.30 REACTIVE ARTHRITIS, UNSPECIFIED SITE (H): Primary | ICD-10-CM

## 2021-11-19 DIAGNOSIS — M18.11 PRIMARY OSTEOARTHRITIS OF FIRST CARPOMETACARPAL JOINT OF RIGHT HAND: ICD-10-CM

## 2021-11-19 PROCEDURE — 99214 OFFICE O/P EST MOD 30 MIN: CPT | Mod: 25 | Performed by: INTERNAL MEDICINE

## 2021-11-19 PROCEDURE — 90686 IIV4 VACC NO PRSV 0.5 ML IM: CPT | Performed by: INTERNAL MEDICINE

## 2021-11-19 PROCEDURE — 90471 IMMUNIZATION ADMIN: CPT | Performed by: INTERNAL MEDICINE

## 2021-11-19 ASSESSMENT — PAIN SCALES - GENERAL: PAINLEVEL: NO PAIN (0)

## 2021-11-19 NOTE — PATIENT INSTRUCTIONS
RHEUMATOLOGY    Dr. Roosevelt Flores    [address] [place] [address]  Phone number: [phone]  Fax number: [phone]    Thank you for choosing [facility]!    Ira Mosqueda CMA Rheumatology      Please schedule your covid 19 vaccine at [phone].

## 2021-11-19 NOTE — PROGRESS NOTES
Rheumatology Clinic Visit      Viet Ohara MRN# [id number]   YOB: 1975 Age: 46 year old      Date of visit: 11/19/21   PCP: Dr. Trina Espinoza    Chief Complaint   Patient presents with:  Reactive arthritis    Assessment and Plan     1. Reactive arthritis: From chart review, he was in [place] where he developed a gastrointestinal illness and was later diagnosed with reactive arthritis with dactylitis in 2012. Per the patient, previous treatments include prednisone (ineffective), methotrexate (ineffective), sulfasalazine (ineffective), then Humira that was very effective.  At one point he discontinued Humira because he was doing well and all of the symptoms returned so he restarted Humira with resolution of the symptoms.  Currently taking Humira every 3 weeks, with mild symptoms returning if he extends the interval beyond this.  Never had systemic symptoms such as fatigue.     Chronic illness, stable  - Continue Humira 40 mg subcutaneous every 21 days  - Labs in August 2022: CBC, Cr, Hepatic Panel, ESR, CRP, QuantiFERON-TB gold plus    2.  Primary osteoarthritis of the right first CMC joint: Reviewed the diagnosis and treatment options.  Discussed the option for steroid injection, hand therapy, immobilization splint, topical Voltaren gel.  He would like to try the topical Voltaren gel first.  Hand therapy referral given in case he would like to go in the future.  Chronic illness, progressive.   - Start diclofenac (VOLTAREN) 1 % topical gel; Apply up to 2 grams of 1% gel to hand up to 4 times daily as needed for joint pain (maximum: 8 g per upper extremity per day)  Dispense: 200 g; Refill: 3  - Hand therapy referral    3.  Vaccinations: Vaccinations reviewed with Mr. Ohara.  Risks and benefits of vaccinations were discussed.    - Influenza: To receive today  - Amfjnry23: up to date  - [id number]: up to date  - COVID-19: has received the Priti vaccine on 4/8/2021; advised getting 1 dose of an  mRNA vaccine     Total minutes spent in evaluation with patient, documentation, , and review of pertinent studies and chart notes: 20    Mr. Ohara verbalized agreement with and understanding of the rational for the diagnosis and treatment plan.  All questions were answered to best of my ability and the patient's satisfaction. Mr. Ohara was advised to contact the clinic with any questions that may arise after the clinic visit.      Thank you for involving me in the care of the patient    Return to clinic: August 2022      HPI   Viet Ohara is a 46 year old male with a medical history significant for reactive arthritis, rupture of plantaris tendon, and history of trigger finger who presents for follow-up of reactive arthritis.    5/17/2016: no show to scheduled clinic visit    6/14/2016: Mr. Ohara reported that prednisone was effective in the past but NSAIDs, methotrexate, and sulfasalazine were ineffective. Humira has been very effective. He discontinued Humira one point and all the symptoms returned. Later, he started taking Humira every 3 weeks instead of every 2 weeks and his symptoms returned.    5/16/2017: no show to scheduled clinic visit    9/14/2017: evaluated in clinic    8/9/2018: he reported doing very well with no joint pain when taking Humira at least every 4 weeks.  He tried changing the Humira to be every 3 weeks and felt well so changed Humira to every 4 weeks with no worsening symptoms.  He did not take humira for 6 weeks once and had more pain in his left hand that resolved with taking the next Humira dose.       8/8/2019: no show to scheduled clinic visit    9/20/2019: Mr. Ohara reports that he is doing well on Humira every 21 days.  If he waits that Humira every 4 weeks and has return of symptoms before the next shot.  Happy with how well he is doing and does not want to change anything.  No joint swelling or pain at this time.  No morning stiffness.    2/10/2021: no show to  scheduled appointment    3/2/2021: Currently taking Humira q3 weeks and if delayed passes interval then he has return of symptoms.  Pain at his right thumb, where it hurts at the IP joint but if he palpates on the palmar aspect of the MCP joint near the A1 pulley then he has some tenderness and this has been an issue for about 1 month.  He says that this is similar to the trigger finger symptoms he had in the past, that was initial presentation of his reactive arthritis, per patient and injections were very helpful at that time but he prefers not to have injections because of the associated pain with the injection itself but if absolutely needed then he would be okay with it.  Has ibuprofen at home that he finds effective for headaches.    Today, 11/19/2021: Humira every 3 weeks is very effective and if he goes for 4 weeks without Humira then he notices more joint pain.  Right first CMC joint aches with increased activity and improves with rest.  Has not tried treating his right first CMC joint pain.  Has not been to hand therapy.  No swelling of the right first CMC joint.    Denies fevers, chills, nausea, vomiting, constipation, diarrhea. No abdominal pain. No chest pain/pressure, palpitations, or shortness of breath. No oral or nasal sores. No neck pain. No rash. No LE swelling.    Tobacco: none  EtOH: 1 beer daily  Drugs: none  Occupation: Operates the camera for [employer]    ROS   12 point review of system was completed and negative except as noted in the HPI     Active Problem List     Patient Active Problem List   Diagnosis     CARDIOVASCULAR SCREENING; LDL GOAL LESS THAN 160     Trigger finger     Reactive arthritis (H)     Encounter for long-term current use of medication     Rupture of plantaris tendon     Baker's cyst of knee     High risk medication use     Past Medical History     Past Medical History:   Diagnosis Date     CARDIOVASCULAR SCREENING; LDL GOAL LESS THAN 160 10/31/2010     Reactive  "arthritis (H)      Trigger finger     Bilateral Hands that require periodic steroid shots     Past Surgical History     Past Surgical History:   Procedure Laterality Date     ZZC NONSPECIFIC PROCEDURE      multiple hand/ortho for trigger finger release x 4     Allergy     Allergies   Allergen Reactions     Nkda [No Known Drug Allergies]      Current Medication List     Current Outpatient Medications   Medication Sig     adalimumab (HUMIRA *CF*) 40 MG/0.4ML pen kit Inject 0.4 mLs (40 mg) Subcutaneous every 14 days . Rheumatology follow-up required yearly     No current facility-administered medications for this visit.     Social History   See HPI    Family History     Family History   Problem Relation Age of Onset     Cancer Paternal Grandfather         lung +tob     C.A.D. No family hx of      Diabetes No family hx of      Hypertension No family hx of      Cerebrovascular Disease No family hx of      Connective Tissue Disorder No family hx of      Physical Exam     Temp Readings from Last 3 Encounters:   09/14/17 97.3  F (36.3  C) (Oral)   11/16/16 97.7  F (36.5  C)   06/14/16 98.3  F (36.8  C) (Oral)     BP Readings from Last 5 Encounters:   11/19/21 113/75   09/20/19 104/62   08/09/18 113/66   09/14/17 104/60   11/16/16 102/64     Pulse Readings from Last 1 Encounters:   11/19/21 69     Resp Readings from Last 1 Encounters:   08/09/18 14     Estimated body mass index is 23.29 kg/m  as calculated from the following:    Height as of 9/20/19: 1.918 m (6' 3.5\").    Weight as of this encounter: 85.6 kg (188 lb 12.8 oz).    GEN: NAD.   HEENT:  Anicteric, noninjected sclera. No obvious external lesions of the ear and nose. Hearing intact.  CV: S1, S2. RRR. No m/r/g  PULM: No increased work of breathing. CTA bilaterally   MSK: MCPs, PIPs, DIPs without swelling or tenderness to palpation.  Squaring and mild tenderness palpation of the right first CMC joint.  Negative Finkelstein's maneuver.  Wrists without swelling or " tenderness to palpation.  Elbows and shoulders without swelling or tenderness to palpation.    Knees, ankles, and MTPs without swelling or tenderness to palpation.    SKIN: No rash or jaundice seen  PSYCH: Alert. Appropriate.      Labs / Imaging (select studies)     CBC  Recent Labs   Lab Test 08/17/21  0910 09/20/19  0900 08/09/18  0902 09/14/17  0920   WBC 5.8 6.5 5.7 5.6   RBC 4.91 4.62 4.48 4.53   HGB 15.5 14.4 14.3 14.8   HCT 46.1 42.5 41.6 42.0   MCV 94 92 93 93   RDW 12.5 12.7 12.9 12.9    281 273 278   MCH 31.6 31.2 31.9 32.7   MCHC 33.6 33.9 34.4 35.2   NEUTROPHIL 52 49.0 45.7 42.9   LYMPH 36 37.8 41.2 44.4   MONOCYTE 9 8.8 8.8 9.1   EOSINOPHIL 2 3.9 3.9 3.2   BASOPHIL 0 0.5 0.4 0.4   ANEU  --  3.2 2.6 2.4   ALYM  --  2.5 2.4 2.5   BALWINDER  --  0.6 0.5 0.5   AEOS  --  0.3 0.2 0.2   ABAS  --  0.0 0.0 0.0   ANEUTAUTO 3.0  --   --   --    ALYMPAUTO 2.1  --   --   --    AMONOAUTO 0.5  --   --   --    AEOSAUTO 0.1  --   --   --    ABSBASO 0.0  --   --   --      CMP  Recent Labs   Lab Test 08/17/21  0910 09/20/19  0900 08/09/18  0902 09/14/17  0920 06/14/16  1419 02/24/15  0903 12/12/14  1052   NA  --   --   --   --  138  --  136   POTASSIUM  --   --   --   --  3.9  --  4.2   CHLORIDE  --   --   --   --  105  --  102   CO2  --   --   --   --  24  --  29   ANIONGAP  --   --   --   --  9  --  5   GLC  --   --   --   --  90  --  81   BUN  --   --   --   --  11  --  15   CR 0.92 0.89 0.95 0.83 0.82  --  1.02   GFRESTIMATED >90 >90 87 >90 >90  Non African American GFR Calc    --  81   GFRESTBLACK  --  >90 >90 >90 >90  African American GFR Calc    --  >90   GFR Calc     LULU  --   --   --  8.8 8.4*  --  8.8   BILITOTAL 1.5* 0.8 1.3 1.2 1.4*   < > 1.0   ALBUMIN 3.9 3.8 3.8 3.8 3.9   < > 4.1   PROTTOTAL 8.0 7.3 7.4 7.4 7.8   < > 7.9   ALKPHOS 48 49 51 50 53   < > 63   AST 25 21 23 20 24   < > 25   ALT 37 24 25 30 28   < > 43    < > = values in this interval not displayed.     Calcium/VitaminD  Recent  Labs   Lab Test 09/14/17  0920 06/14/16  1419 12/12/14  1052   LULU 8.8 8.4* 8.8   VITDT 33  --   --      ESR/CRP  Recent Labs   Lab Test 08/17/21  0910 09/20/19  0900 08/09/18  0902   SED 8 8 7   CRP <2.9 <2.9 <2.9       Hepatitis B  Recent Labs   Lab Test 06/14/16  1419 05/08/15  0928   HBCAB Nonreactive  --    HEPBANG Nonreactive Nonreactive     Hepatitis C  Recent Labs   Lab Test 06/14/16  1419 05/08/15  0928   HCVAB Nonreactive   Assay performance characteristics have not been established for newborns,   infants, and children   Nonreactive   Assay performance characteristics have not been established for newborns,   infants, and children       Lyme ab screening  No results for input(s): LYMEGM in the last 65905 hours.  Lyme confirmation testing by Western Blot  No results for input(s): LYWG, LYWM in the last 03357 hours.  Tuberculosis Screening  Recent Labs   Lab Test 08/17/21  0910 08/09/18  0902 06/14/16  1418 05/08/15  0930   TBRES Negative Negative  --   --    TBRSLT  --   --  Indeterminate   Results are indeterminate, possible impaired immune response.  Consider   submitting a repeat sample in 1 to 2 months.  * Negative   TBAGN  --   --  0.00 0.01     HIV Screening  Recent Labs   Lab Test 06/14/16  1419   HIAGAB Nonreactive   HIV-1 p24 Ag & HIV-1/HIV-2 Ab Not Detected         Immunization History     Immunization History   Administered Date(s) Administered     COVID-19,PF,Priti 04/08/2021     Influenza (IIV3) PF 11/15/2011     Pneumo Conj 13-V (2010&after) 09/14/2017     Pneumococcal 23 valent 01/17/2013     TDAP Vaccine (Adacel) 02/20/2015          Chart documentation done in part with Dragon Voice recognition Software. Although reviewed after completion, some word and grammatical error may remain.    Roosevelt Flores MD

## 2022-02-03 ENCOUNTER — TELEPHONE (OUTPATIENT)
Dept: RHEUMATOLOGY | Facility: CLINIC | Age: 47
End: 2022-02-03
Payer: COMMERCIAL

## 2022-02-03 NOTE — TELEPHONE ENCOUNTER
[facility] Call Center    Phone Message    May a detailed message be left on voicemail: yes     Reason for Call: Symptoms or Concerns     Current symptom or concern: Pt is experiencing a lot of pain in hand and is wondering if we can get in sooner.     Action Taken: Message routed to:  Other: [facility]     Travel Screening: Not Applicable

## 2022-02-03 NOTE — TELEPHONE ENCOUNTER
"Returned call to pt who c/o pain \"can hardly button his pants\". R thumb, has used topical with no help. Swollen, not red, not warm, has used Ibuprofen.  Has spoke to Dr. Flores about this in the past and his alternative option was an steroid injection.  Will forward to Dr. Flores and see if there is a spot available where patient can be added in.  Writer will call patient back with the plan.     Patricia BARKER RN Specialty Triage 2/3/2022 10:06 AM    "

## 2022-02-03 NOTE — TELEPHONE ENCOUNTER
Returned call to patient and scheduled for a follow-up appointment on Monday the seventh at 820 in [place].    Patricia BARKER RN Specialty Triage 2/3/2022 10:45 AM

## 2022-02-07 ENCOUNTER — OFFICE VISIT (OUTPATIENT)
Dept: RHEUMATOLOGY | Facility: CLINIC | Age: 47
End: 2022-02-07
Payer: COMMERCIAL

## 2022-02-07 VITALS
SYSTOLIC BLOOD PRESSURE: 127 MMHG | WEIGHT: 188 LBS | BODY MASS INDEX: 23.19 KG/M2 | DIASTOLIC BLOOD PRESSURE: 76 MMHG | OXYGEN SATURATION: 96 % | HEART RATE: 71 BPM

## 2022-02-07 DIAGNOSIS — M18.11 PRIMARY OSTEOARTHRITIS OF FIRST CARPOMETACARPAL JOINT OF RIGHT HAND: Primary | ICD-10-CM

## 2022-02-07 DIAGNOSIS — M02.30 REACTIVE ARTHRITIS, UNSPECIFIED SITE (H): ICD-10-CM

## 2022-02-07 PROCEDURE — 99214 OFFICE O/P EST MOD 30 MIN: CPT | Mod: 25 | Performed by: INTERNAL MEDICINE

## 2022-02-07 PROCEDURE — 20600 DRAIN/INJ JOINT/BURSA W/O US: CPT | Mod: RT | Performed by: INTERNAL MEDICINE

## 2022-02-07 RX ORDER — METHYLPREDNISOLONE ACETATE 40 MG/ML
10 INJECTION, SUSPENSION INTRA-ARTICULAR; INTRALESIONAL; INTRAMUSCULAR; SOFT TISSUE ONCE
Status: COMPLETED | OUTPATIENT
Start: 2022-02-07 | End: 2022-02-07

## 2022-02-07 RX ORDER — PREDNISONE 5 MG/1
TABLET ORAL
Qty: 28 TABLET | Refills: 0 | Status: SHIPPED | OUTPATIENT
Start: 2022-02-07 | End: 2022-02-28

## 2022-02-07 RX ADMIN — METHYLPREDNISOLONE ACETATE 10 MG: 40 INJECTION, SUSPENSION INTRA-ARTICULAR; INTRALESIONAL; INTRAMUSCULAR; SOFT TISSUE at 13:51

## 2022-02-07 NOTE — PROGRESS NOTES
Rheumatology Clinic Visit      Viet Ohara MRN# [id number]   YOB: 1975 Age: 46 year old      Date of visit: 2/07/22   PCP: Dr. Trina Espinoza    Chief Complaint   Patient presents with:  RECHECK    Assessment and Plan     1. Reactive arthritis: From chart review, he was in [place] where he developed a gastrointestinal illness and was later diagnosed with reactive arthritis with dactylitis in 2012. Per the patient, previous treatments include prednisone (ineffective), methotrexate (ineffective), sulfasalazine (ineffective), then Humira that was very effective.  At one point he discontinued Humira because he was doing well and all of the symptoms returned so he restarted Humira with resolution of the symptoms.  Currently taking Humira every 3 weeks, with mild symptoms returning if he extends the interval beyond this.  Never had systemic symptoms such as fatigue.  More recently with swelling of his right fifth PIP; has been present for about 10 days and he would like steroids to treat this.  This is reasonable on prednisone given as noted below.  Because he will be going on a trip, I asked that he not start prednisone until after he returns so as to not increase COVID-19 infection risk while potentially having increased exposure during travel.  Chronic illness, progressive.    - Continue Humira 40 mg subcutaneous every 21 days  - Start prednisone 10 mg daily x7 days, then 5 mg daily x14 days, then stop     # Prednisone Risks and Benefits: The risks and benefits of prednisone were discussed in detail and the patient verbalized understanding.  The risks discussed include, but are not limited to, weight gain, fluid retention, impaired wound healing, hyperglycemia, adrenal suppression, GI upset, peptic ulcer, hepatotoxicity, aseptic necrosis of the femoral and humeral heads, osteoporosis, myopathy, tendon rupture (particularly Achilles tendon), ocular changes including an increased intraocular pressure.   Also discussed high risk for infection while on prednisone.  I encouraged reviewing the package insert and asking any questions about the medication.      2.  Primary osteoarthritis of the right first CMC joint: Reviewed the diagnosis and treatment options again today.  Topical voltaren used already.  Had previously discussed steroid injection and he is requesting that today.  Chronic illness, progressive.     - Steroid injection as documented in the procedure section    Total minutes spent in evaluation with patient, documentation, , and review of pertinent studies and chart notes: 15    Mr. Ohara verbalized agreement with and understanding of the rational for the diagnosis and treatment plan.  All questions were answered to best of my ability and the patient's satisfaction. Mr. Ohara was advised to contact the clinic with any questions that may arise after the clinic visit.      Thank you for involving me in the care of the patient    Return to clinic: August 2022      HPI   Viet Ohara is a 46 year old male with a medical history significant for reactive arthritis, rupture of plantaris tendon, and history of trigger finger who presents for follow-up of reactive arthritis.    5/17/2016: no show to scheduled clinic visit    6/14/2016: Mr. Ohara reported that prednisone was effective in the past but NSAIDs, methotrexate, and sulfasalazine were ineffective. Humira has been very effective. He discontinued Humira one point and all the symptoms returned. Later, he started taking Humira every 3 weeks instead of every 2 weeks and his symptoms returned.    5/16/2017: no show to scheduled clinic visit    9/14/2017: evaluated in clinic    8/9/2018: he reported doing very well with no joint pain when taking Humira at least every 4 weeks.  He tried changing the Humira to be every 3 weeks and felt well so changed Humira to every 4 weeks with no worsening symptoms.  He did not take humira for 6 weeks once and  had more pain in his left hand that resolved with taking the next Humira dose.       8/8/2019: no show to scheduled clinic visit    9/20/2019: Mr. Ohara reports that he is doing well on Humira every 21 days.  If he waits that Humira every 4 weeks and has return of symptoms before the next shot.  Happy with how well he is doing and does not want to change anything.  No joint swelling or pain at this time.  No morning stiffness.    2/10/2021: no show to scheduled appointment    3/2/2021: Currently taking Humira q3 weeks and if delayed passes interval then he has return of symptoms.  Pain at his right thumb, where it hurts at the IP joint but if he palpates on the palmar aspect of the MCP joint near the A1 pulley then he has some tenderness and this has been an issue for about 1 month.  He says that this is similar to the trigger finger symptoms he had in the past, that was initial presentation of his reactive arthritis, per patient and injections were very helpful at that time but he prefers not to have injections because of the associated pain with the injection itself but if absolutely needed then he would be okay with it.  Has ibuprofen at home that he finds effective for headaches.    11/19/2021: Humira every 3 weeks is very effective and if he goes for 4 weeks without Humira then he notices more joint pain.  Right first CMC joint aches with increased activity and improves with rest.  Has not tried treating his right first CMC joint pain.  Has not been to hand therapy.  No swelling of the right first CMC joint.    Today, 2/8/2022: He missed his appointment earlier today and was rescheduled for the afternoon.  Would like a steroid injection of the right first CMC joint where he has pain that is worse with activity and improves with rest.  He also notes having swelling of the right fifth PIP for about the past 10 days and he would like steroids for this.  Tolerating Humira well.  Other joints are doing well.  No  fatigue.    Denies fevers, chills, nausea, vomiting, constipation, diarrhea. No abdominal pain. No chest pain/pressure, palpitations, or shortness of breath. No oral or nasal sores. No neck pain. No rash. No LE swelling.    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[employer]; still working as a     ROS   12 point review of system was completed and negative except as noted in the HPI     Active Problem List     Patient Active Problem List   Diagnosis     CARDIOVASCULAR SCREENING; LDL GOAL LESS THAN 160     Trigger finger     Reactive arthritis (H)     Encounter for long-term current use of medication     Rupture of plantaris tendon     Baker's cyst of knee     High risk medication use     Past Medical History     Past Medical History:   Diagnosis Date     CARDIOVASCULAR SCREENING; LDL GOAL LESS THAN 160 10/31/2010     Reactive arthritis (H)      Trigger finger     Bilateral Hands that require periodic steroid shots     Past Surgical History     Past Surgical History:   Procedure Laterality Date     ZC NONSPECIFIC PROCEDURE      multiple hand/ortho for trigger finger release x 4     Allergy     Allergies   Allergen Reactions     Nkda [No Known Drug Allergies]      Current Medication List     Current Outpatient Medications   Medication Sig     adalimumab (HUMIRA *CF*) 40 MG/0.4ML pen kit Inject 0.4 mLs (40 mg) Subcutaneous every 21 days . Rheumatology follow-up required yearly     diclofenac (VOLTAREN) 1 % topical gel Apply up to 2 grams of 1% gel to hand up to 4 times daily as needed for joint pain (maximum: 8 g per upper extremity per day)     predniSONE (DELTASONE) 5 MG tablet Take 2 tablets (10 mg) by mouth daily for 7 days, THEN 1 tablet (5 mg) daily for 14 days.     No current facility-administered medications for this visit.     Social History   See HPI    Family History     Family History   Problem Relation Age of Onset     Cancer Paternal  "Grandfather         lung +tob     C.A.D. No family hx of      Diabetes No family hx of      Hypertension No family hx of      Cerebrovascular Disease No family hx of      Connective Tissue Disorder No family hx of      Physical Exam     Temp Readings from Last 3 Encounters:   09/14/17 97.3  F (36.3  C) (Oral)   11/16/16 97.7  F (36.5  C)   06/14/16 98.3  F (36.8  C) (Oral)     BP Readings from Last 5 Encounters:   02/07/22 127/76   11/19/21 113/75   09/20/19 104/62   08/09/18 113/66   09/14/17 104/60     Pulse Readings from Last 1 Encounters:   02/07/22 71     Resp Readings from Last 1 Encounters:   08/09/18 14     Estimated body mass index is 23.19 kg/m  as calculated from the following:    Height as of 9/20/19: 1.918 m (6' 3.5\").    Weight as of this encounter: 85.3 kg (188 lb).    GEN: NAD.   HEENT:  Anicteric, noninjected sclera. No obvious external lesions of the ear and nose. Hearing intact.  PULM: No increased work of breathing.   MSK: Squaring and tenderness to palpation of the right first CMC joint; no effusion, increased warmth, or overlying erythema.  Right fifth PIP with mild synovial swelling and tenderness palpation but no increased warmth overlying erythema.  Other joints of the hands, wrist, elbow, shoulders, knees, ankles, and MTPs without swelling or tenderness to palpation.  SKIN: No rash or jaundice seen  PSYCH: Alert. Appropriate.      Labs / Imaging (select studies)     CBC  Recent Labs   Lab Test 08/17/21  0910 09/20/19  0900 08/09/18  0902 09/14/17  0920   WBC 5.8 6.5 5.7 5.6   RBC 4.91 4.62 4.48 4.53   HGB 15.5 14.4 14.3 14.8   HCT 46.1 42.5 41.6 42.0   MCV 94 92 93 93   RDW 12.5 12.7 12.9 12.9    281 273 278   MCH 31.6 31.2 31.9 32.7   MCHC 33.6 33.9 34.4 35.2   NEUTROPHIL 52 49.0 45.7 42.9   LYMPH 36 37.8 41.2 44.4   MONOCYTE 9 8.8 8.8 9.1   EOSINOPHIL 2 3.9 3.9 3.2   BASOPHIL 0 0.5 0.4 0.4   ANEU  --  3.2 2.6 2.4   ALYM  --  2.5 2.4 2.5   BALWINDER  --  0.6 0.5 0.5   AEOS  --  0.3 0.2 " 0.2   ABAS  --  0.0 0.0 0.0   ANEUTAUTO 3.0  --   --   --    ALYMPAUTO 2.1  --   --   --    AMONOAUTO 0.5  --   --   --    AEOSAUTO 0.1  --   --   --    ABSBASO 0.0  --   --   --      CMP  Recent Labs   Lab Test 08/17/21  0910 09/20/19  0900 08/09/18  0902 09/14/17  0920 06/14/16  1419 02/24/15  0903 12/12/14  1052   NA  --   --   --   --  138  --  136   POTASSIUM  --   --   --   --  3.9  --  4.2   CHLORIDE  --   --   --   --  105  --  102   CO2  --   --   --   --  24  --  29   ANIONGAP  --   --   --   --  9  --  5   GLC  --   --   --   --  90  --  81   BUN  --   --   --   --  11  --  15   CR 0.92 0.89 0.95 0.83 0.82  --  1.02   GFRESTIMATED >90 >90 87 >90 >90  Non African American GFR Calc    --  81   GFRESTBLACK  --  >90 >90 >90 >90  African American GFR Calc    --  >90   GFR Calc     LULU  --   --   --  8.8 8.4*  --  8.8   BILITOTAL 1.5* 0.8 1.3 1.2 1.4*   < > 1.0   ALBUMIN 3.9 3.8 3.8 3.8 3.9   < > 4.1   PROTTOTAL 8.0 7.3 7.4 7.4 7.8   < > 7.9   ALKPHOS 48 49 51 50 53   < > 63   AST 25 21 23 20 24   < > 25   ALT 37 24 25 30 28   < > 43    < > = values in this interval not displayed.     Calcium/VitaminD  Recent Labs   Lab Test 09/14/17 0920 06/14/16 1419 12/12/14  1052   LULU 8.8 8.4* 8.8   VITDT 33  --   --      ESR/CRP  Recent Labs   Lab Test 08/17/21  0910 09/20/19  0900 08/09/18  0902   SED 8 8 7   CRP <2.9 <2.9 <2.9     Hepatitis B  Recent Labs   Lab Test 06/14/16  1419 05/08/15  0928   HBCAB Nonreactive  --    HEPBANG Nonreactive Nonreactive     Hepatitis C  Recent Labs   Lab Test 06/14/16  1419 05/08/15  0928   HCVAB Nonreactive   Assay performance characteristics have not been established for newborns,   infants, and children   Nonreactive   Assay performance characteristics have not been established for newborns,   infants, and children       Tuberculosis Screening  Recent Labs   Lab Test 08/17/21  0910 08/09/18  0902 06/14/16  1418 05/08/15  0930   TBRES Negative Negative  --   --     TBRSLT  --   --  Indeterminate   Results are indeterminate, possible impaired immune response.  Consider   submitting a repeat sample in 1 to 2 months.  * Negative   TBAGN  --   --  0.00 0.01     HIV Screening  Recent Labs   Lab Test 06/14/16  1419   HIAGAB Nonreactive   HIV-1 p24 Ag & HIV-1/HIV-2 Ab Not Detected       Immunization History     Immunization History   Administered Date(s) Administered     COVID-19,PF,Priti 04/08/2021     COVID-19,PF,Moderna 11/20/2021     Influenza (IIV3) PF 11/15/2011     Influenza Vaccine IM > 6 months Valent IIV4 (Alfuria,Fluzone) 11/19/2021     Pneumo Conj 13-V (2010&after) 09/14/2017     Pneumococcal 23 valent 01/17/2013     TDAP Vaccine (Adacel) 02/20/2015       Procedure     Procedure: Steroid injection of the right 1st CMC joint  Indication: Pain, osteoarthritis     The procedure was explained in detail. Risks including infection, pain, structural damage such as cartilage damage and tendon rupture, fat atrophy, skin hyper-/hypo-pigmentation, and medication reaction was explained. The need for rest of the affected joint for one week after the procedure was explained.  The option of not doing the procedure was also provided. All questions were answered and the patient consented to the procedure.     A time-out was performed and the correct patient, procedure, and laterality were verified.    The right 1st carpometacarpal joint was examined and location for injection was identified. The area was cleaned with chlorhexidine, twice.  Ethyl chloride was then used for topical anaesthetic.  Then a mixture of lidocaine 1% 0.1 mL and methylprednisolone 10mg was injected into the intra-articular space.     The patient tolerated the procedure well. No complications.    1% Lidocaine  : [employer]  Lot #: [id number]  EXPIRATION DATE: 04/2023  NDC: 7130-2615-21    MEDICATION: Methylprednisolone  LOT #: [id number]  [employer]   EXPIRATION DATE:  10/2022   NDC#:  8042-0498-97           Chart documentation done in part with Dragon Voice recognition Software. Although reviewed after completion, some word and grammatical error may remain.    Roosevelt Flores MD

## 2022-05-03 ENCOUNTER — TELEPHONE (OUTPATIENT)
Dept: RHEUMATOLOGY | Facility: CLINIC | Age: 47
End: 2022-05-03

## 2022-05-03 NOTE — TELEPHONE ENCOUNTER
Prior Authorization Approval    Authorization Effective Date: 4/15/2022  Authorization Expiration Date: 5/3/2023  Medication: Humira- Approved   Approved Dose/Quantity: 1 per 21DS  Reference #:     Insurance Company: [employer] - Phone [phone] Fax [phone]  Expected CoPay: $3.00     CoPay Card Available:      Foundation Assistance Needed:    Which Pharmacy is filling the prescription (Not needed for infusion/clinic administered): [facility] - [place], [place] - [address]  Pharmacy Notified: Yes  Patient Notified: Yes

## 2022-08-08 ENCOUNTER — LAB (OUTPATIENT)
Dept: LAB | Facility: CLINIC | Age: 47
End: 2022-08-08
Payer: COMMERCIAL

## 2022-08-08 DIAGNOSIS — M02.30 REACTIVE ARTHRITIS, UNSPECIFIED SITE (H): ICD-10-CM

## 2022-08-08 DIAGNOSIS — Z79.899 HIGH RISK MEDICATION USE: ICD-10-CM

## 2022-08-08 LAB
ALBUMIN SERPL-MCNC: 3.8 G/DL (ref 3.4–5)
ALP SERPL-CCNC: 51 U/L (ref 40–150)
ALT SERPL W P-5'-P-CCNC: 30 U/L (ref 0–70)
AST SERPL W P-5'-P-CCNC: 28 U/L (ref 0–45)
BASOPHILS # BLD AUTO: 0 10E3/UL (ref 0–0.2)
BASOPHILS NFR BLD AUTO: 1 %
BILIRUB DIRECT SERPL-MCNC: 0.2 MG/DL (ref 0–0.2)
BILIRUB SERPL-MCNC: 1 MG/DL (ref 0.2–1.3)
CREAT SERPL-MCNC: 0.81 MG/DL (ref 0.66–1.25)
CRP SERPL-MCNC: <3 MG/L
EOSINOPHIL # BLD AUTO: 0.2 10E3/UL (ref 0–0.7)
EOSINOPHIL NFR BLD AUTO: 3 %
ERYTHROCYTE [DISTWIDTH] IN BLOOD BY AUTOMATED COUNT: 12.2 % (ref 10–15)
ERYTHROCYTE [SEDIMENTATION RATE] IN BLOOD BY WESTERGREN METHOD: 6 MM/HR (ref 0–15)
GFR SERPL CREATININE-BSD FRML MDRD: >90 ML/MIN/1.73M2
HCT VFR BLD AUTO: 41.9 % (ref 40–53)
HGB BLD-MCNC: 14.5 G/DL (ref 13.3–17.7)
IMM GRANULOCYTES # BLD: 0 10E3/UL
IMM GRANULOCYTES NFR BLD: 0 %
LYMPHOCYTES # BLD AUTO: 2.3 10E3/UL (ref 0.8–5.3)
LYMPHOCYTES NFR BLD AUTO: 45 %
MCH RBC QN AUTO: 31.3 PG (ref 26.5–33)
MCHC RBC AUTO-ENTMCNC: 34.6 G/DL (ref 31.5–36.5)
MCV RBC AUTO: 90 FL (ref 78–100)
MONOCYTES # BLD AUTO: 0.4 10E3/UL (ref 0–1.3)
MONOCYTES NFR BLD AUTO: 8 %
NEUTROPHILS # BLD AUTO: 2.2 10E3/UL (ref 1.6–8.3)
NEUTROPHILS NFR BLD AUTO: 43 %
PLATELET # BLD AUTO: 267 10E3/UL (ref 150–450)
PROT SERPL-MCNC: 7.3 G/DL (ref 6.8–8.8)
RBC # BLD AUTO: 4.64 10E6/UL (ref 4.4–5.9)
WBC # BLD AUTO: 5.2 10E3/UL (ref 4–11)

## 2022-08-08 PROCEDURE — 86140 C-REACTIVE PROTEIN: CPT

## 2022-08-08 PROCEDURE — 86481 TB AG RESPONSE T-CELL SUSP: CPT

## 2022-08-08 PROCEDURE — 80076 HEPATIC FUNCTION PANEL: CPT

## 2022-08-08 PROCEDURE — 85652 RBC SED RATE AUTOMATED: CPT

## 2022-08-08 PROCEDURE — 82565 ASSAY OF CREATININE: CPT

## 2022-08-08 PROCEDURE — 36415 COLL VENOUS BLD VENIPUNCTURE: CPT

## 2022-08-08 PROCEDURE — 85025 COMPLETE CBC W/AUTO DIFF WBC: CPT

## 2022-08-09 LAB
GAMMA INTERFERON BACKGROUND BLD IA-ACNC: 0.04 IU/ML
M TB IFN-G BLD-IMP: NEGATIVE
M TB IFN-G CD4+ BCKGRND COR BLD-ACNC: 9.96 IU/ML
MITOGEN IGNF BCKGRD COR BLD-ACNC: -0.02 IU/ML
MITOGEN IGNF BCKGRD COR BLD-ACNC: -0.02 IU/ML
QUANTIFERON MITOGEN: 10 IU/ML
QUANTIFERON NIL TUBE: 0.04 IU/ML
QUANTIFERON TB1 TUBE: 0.02 IU/ML
QUANTIFERON TB2 TUBE: 0.02

## 2022-11-28 DIAGNOSIS — M02.30 REACTIVE ARTHRITIS, UNSPECIFIED SITE (H): ICD-10-CM

## 2022-12-20 ENCOUNTER — OFFICE VISIT (OUTPATIENT)
Dept: RHEUMATOLOGY | Facility: CLINIC | Age: 47
End: 2022-12-20
Payer: COMMERCIAL

## 2022-12-20 VITALS
DIASTOLIC BLOOD PRESSURE: 73 MMHG | BODY MASS INDEX: 23.93 KG/M2 | SYSTOLIC BLOOD PRESSURE: 116 MMHG | WEIGHT: 194 LBS | HEART RATE: 81 BPM

## 2022-12-20 DIAGNOSIS — M02.30 REACTIVE ARTHRITIS, UNSPECIFIED SITE (H): ICD-10-CM

## 2022-12-20 PROCEDURE — 99214 OFFICE O/P EST MOD 30 MIN: CPT | Performed by: INTERNAL MEDICINE

## 2022-12-20 NOTE — PROGRESS NOTES
Rheumatology Clinic Visit      Viet Ohara MRN# [id number]   YOB: 1975 Age: 47 year old      Date of visit: 12/20/22   PCP: Dr. Trina Espinoza    Chief Complaint   Patient presents with:  Follow Up    Assessment and Plan     1. Reactive arthritis: From chart review, he was in [place] where he developed a gastrointestinal illness and was later diagnosed with reactive arthritis with dactylitis in 2012. Per the patient, previous treatments include prednisone (ineffective), methotrexate (ineffective), sulfasalazine (ineffective), then Humira that was very effective.  At one point he discontinued Humira because he was doing well and all of the symptoms returned so he restarted Humira with resolution of the symptoms.  Currently taking Humira every 3 weeks, with mild symptoms returning if he extends the interval beyond this.  Never had systemic symptoms such as fatigue.  Doing well at this time.  Continue Humira monotherapy.  Chronic illness, stable.    - Continue Humira 40 mg subcutaneous every 21 days  - Labs in 6 months: CBC, Creatinine, Hepatic Panel, ESR, CRP    2.  Primary osteoarthritis of the right first CMC joint: Has improved from topical Voltaren gel and intra-articular injections.  Not an issue at this time.      Total minutes spent in evaluation with patient, documentation, , and review of pertinent studies and chart notes: 16    Mr. Ohara verbalized agreement with and understanding of the rational for the diagnosis and treatment plan.  All questions were answered to best of my ability and the patient's satisfaction. Mr. Ohara was advised to contact the clinic with any questions that may arise after the clinic visit.      Thank you for involving me in the care of the patient    Return to clinic: 6 months      HPI   Viet Ohara is a 47 year old male with a medical history significant for reactive arthritis, rupture of plantaris tendon, and history of trigger finger who  presents for follow-up of reactive arthritis.    5/17/2016: no show to scheduled clinic visit    6/14/2016: Mr. Ohara reported that prednisone was effective in the past but NSAIDs, methotrexate, and sulfasalazine were ineffective. Humira has been very effective. He discontinued Humira one point and all the symptoms returned. Later, he started taking Humira every 3 weeks instead of every 2 weeks and his symptoms returned.    5/16/2017: no show to scheduled clinic visit    9/14/2017: evaluated in clinic    8/9/2018: he reported doing very well with no joint pain when taking Humira at least every 4 weeks.  He tried changing the Humira to be every 3 weeks and felt well so changed Humira to every 4 weeks with no worsening symptoms.  He did not take humira for 6 weeks once and had more pain in his left hand that resolved with taking the next Humira dose.       8/8/2019: no show to scheduled clinic visit    9/20/2019: Mr. Ohara reports that he is doing well on Humira every 21 days.  If he waits that Humira every 4 weeks and has return of symptoms before the next shot.  Happy with how well he is doing and does not want to change anything.  No joint swelling or pain at this time.  No morning stiffness.    2/10/2021: no show to scheduled appointment    3/2/2021: Currently taking Humira q3 weeks and if delayed passes interval then he has return of symptoms.  Pain at his right thumb, where it hurts at the IP joint but if he palpates on the palmar aspect of the MCP joint near the A1 pulley then he has some tenderness and this has been an issue for about 1 month.  He says that this is similar to the trigger finger symptoms he had in the past, that was initial presentation of his reactive arthritis, per patient and injections were very helpful at that time but he prefers not to have injections because of the associated pain with the injection itself but if absolutely needed then he would be okay with it.  Has ibuprofen at home  that he finds effective for headaches.    11/19/2021: Humira every 3 weeks is very effective and if he goes for 4 weeks without Humira then he notices more joint pain.  Right first CMC joint aches with increased activity and improves with rest.  Has not tried treating his right first CMC joint pain.  Has not been to hand therapy.  No swelling of the right first CMC joint.    Today, 2/7/2022: He missed his appointment earlier today and was rescheduled for the afternoon.  Would like a steroid injection of the right first CMC joint where he has pain that is worse with activity and improves with rest.  He also notes having swelling of the right fifth PIP for about the past 10 days and he would like steroids for this.  Tolerating Humira well.  Other joints are doing well.  No fatigue.    3/4/2022: no show to scheduled appointment    8/19/2022: patient cancelled appointment.    Today, 12/20/2022: Currently doing well.  No joint pain or swelling.  No morning stiffness or gelling phenomenon.  Arthritis is not limiting his daily activities.  No fatigue.  No eye pain or redness.  No black or bloody stools.    Denies fevers, chills, nausea, vomiting, constipation, diarrhea. No abdominal pain. No chest pain/pressure, palpitations, or shortness of breath. No oral or nasal sores. No neck pain. No rash. No LE swelling.    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[employer]; still working as a     ROS   12 point review of system was completed and negative except as noted in the HPI     Active Problem List     Patient Active Problem List   Diagnosis     CARDIOVASCULAR SCREENING; LDL GOAL LESS THAN 160     Trigger finger     Reactive arthritis (H)     Encounter for long-term current use of medication     Rupture of plantaris tendon     Baker's cyst of knee     High risk medication use     Past Medical History     Past Medical History:   Diagnosis Date      "CARDIOVASCULAR SCREENING; LDL GOAL LESS THAN 160 10/31/2010     Reactive arthritis (H)      Trigger finger     Bilateral Hands that require periodic steroid shots     Past Surgical History     Past Surgical History:   Procedure Laterality Date     ZZC NONSPECIFIC PROCEDURE      multiple hand/ortho for trigger finger release x 4     Allergy     Allergies   Allergen Reactions     Nkda [No Known Drug Allergies]      Current Medication List     Current Outpatient Medications   Medication Sig     adalimumab (HUMIRA *CF*) 40 MG/0.4ML pen kit Inject 0.4 mLs (40 mg) Subcutaneous every 21 days . Refills at rheumatology follow-up appointments.     diclofenac (VOLTAREN) 1 % topical gel Apply up to 2 grams of 1% gel to hand up to 4 times daily as needed for joint pain (maximum: 8 g per upper extremity per day) (Patient not taking: Reported on 12/20/2022)     No current facility-administered medications for this visit.     Social History   See HPI    Family History     Family History   Problem Relation Age of Onset     Cancer Paternal Grandfather         lung +tob     C.A.D. No family hx of      Diabetes No family hx of      Hypertension No family hx of      Cerebrovascular Disease No family hx of      Connective Tissue Disorder No family hx of      Physical Exam     Temp Readings from Last 3 Encounters:   09/14/17 97.3  F (36.3  C) (Oral)   11/16/16 97.7  F (36.5  C)   06/14/16 98.3  F (36.8  C) (Oral)     BP Readings from Last 5 Encounters:   12/20/22 116/73   02/07/22 127/76   11/19/21 113/75   09/20/19 104/62   08/09/18 113/66     Pulse Readings from Last 1 Encounters:   12/20/22 81     Resp Readings from Last 1 Encounters:   08/09/18 14     Estimated body mass index is 23.93 kg/m  as calculated from the following:    Height as of 9/20/19: 1.918 m (6' 3.5\").    Weight as of this encounter: 88 kg (194 lb).      GEN: NAD.   HEENT:  Anicteric, noninjected sclera. No obvious external lesions of the ear and nose. Hearing " intact.  CV: S1, S2. RRR. No m/r/g  PULM: No increased work of breathing. CTA bilaterally   MSK: MCPs, PIPs, DIPs without swelling or tenderness to palpation.  Wrists without swelling or tenderness to palpation.  Elbows and shoulders without swelling or tenderness to palpation.  Knees, ankles, and MTPs without swelling or tenderness to palpation.  Achilles tendons nontender to palpation.  SKIN: No rash or jaundice seen  PSYCH: Alert. Appropriate.         Labs / Imaging (select studies)     CBC  Recent Labs   Lab Test 08/08/22 0911 08/17/21  0910 09/20/19  0900 08/09/18  0902 09/14/17  0920   WBC 5.2 5.8 6.5 5.7 5.6   RBC 4.64 4.91 4.62 4.48 4.53   HGB 14.5 15.5 14.4 14.3 14.8   HCT 41.9 46.1 42.5 41.6 42.0   MCV 90 94 92 93 93   RDW 12.2 12.5 12.7 12.9 12.9    283 281 273 278   MCH 31.3 31.6 31.2 31.9 32.7   MCHC 34.6 33.6 33.9 34.4 35.2   NEUTROPHIL 43 52 49.0 45.7 42.9   LYMPH 45 36 37.8 41.2 44.4   MONOCYTE 8 9 8.8 8.8 9.1   EOSINOPHIL 3 2 3.9 3.9 3.2   BASOPHIL 1 0 0.5 0.4 0.4   ANEU  --   --  3.2 2.6 2.4   ALYM  --   --  2.5 2.4 2.5   BALWINDER  --   --  0.6 0.5 0.5   AEOS  --   --  0.3 0.2 0.2   ABAS  --   --  0.0 0.0 0.0   ANEUTAUTO 2.2 3.0  --   --   --    ALYMPAUTO 2.3 2.1  --   --   --    AMONOAUTO 0.4 0.5  --   --   --    AEOSAUTO 0.2 0.1  --   --   --    ABSBASO 0.0 0.0  --   --   --      CMP  Recent Labs   Lab Test 08/08/22  0911 08/17/21  0910 09/20/19  0900 08/09/18  0902 09/14/17  0920 06/14/16  1419 02/24/15  0903 12/12/14  1052   NA  --   --   --   --   --  138  --  136   POTASSIUM  --   --   --   --   --  3.9  --  4.2   CHLORIDE  --   --   --   --   --  105  --  102   CO2  --   --   --   --   --  24  --  29   ANIONGAP  --   --   --   --   --  9  --  5   GLC  --   --   --   --   --  90  --  81   BUN  --   --   --   --   --  11  --  15   CR 0.81 0.92 0.89 0.95 0.83 0.82  --  1.02   GFRESTIMATED >90 >90 >90 87 >90 >90  Non African American GFR Calc    --  81   GFRESTBLACK  --   --  >90 >90 >90  >90   GFR Calc    --  >90   GFR Calc     LULU  --   --   --   --  8.8 8.4*  --  8.8   BILITOTAL 1.0 1.5* 0.8 1.3 1.2 1.4*   < > 1.0   ALBUMIN 3.8 3.9 3.8 3.8 3.8 3.9   < > 4.1   PROTTOTAL 7.3 8.0 7.3 7.4 7.4 7.8   < > 7.9   ALKPHOS 51 48 49 51 50 53   < > 63   AST 28 25 21 23 20 24   < > 25   ALT 30 37 24 25 30 28   < > 43    < > = values in this interval not displayed.     Calcium/VitaminD  Recent Labs   Lab Test 09/14/17  0920 06/14/16  1419 12/12/14  1052   LULU 8.8 8.4* 8.8   VITDT 33  --   --      ESR/CRP  Recent Labs   Lab Test 08/08/22  0911 08/17/21  0910 09/20/19  0900   SED 6 8 8   CRP <3.00 <2.9 <2.9     Hepatitis B  Recent Labs   Lab Test 06/14/16  1419 05/08/15  0928   HBCAB Nonreactive  --    HEPBANG Nonreactive Nonreactive     Hepatitis C  Recent Labs   Lab Test 06/14/16  1419 05/08/15  0928   HCVAB Nonreactive   Assay performance characteristics have not been established for newborns,   infants, and children   Nonreactive   Assay performance characteristics have not been established for newborns,   infants, and children       Tuberculosis Screening  Recent Labs   Lab Test 08/08/22  0911 08/17/21  0910 08/09/18  0902 06/14/16  1418 05/08/15  0930   TBRES Negative Negative Negative  --   --    TBRSLT  --   --   --  Indeterminate   Results are indeterminate, possible impaired immune response.  Consider   submitting a repeat sample in 1 to 2 months.  * Negative   TBAGN  --   --   --  0.00 0.01     HIV Screening  Recent Labs   Lab Test 06/14/16  1419   HIAGAB Nonreactive   HIV-1 p24 Ag & HIV-1/HIV-2 Ab Not Detected         Immunization History     Immunization History   Administered Date(s) Administered     COVID-19 Vaccine (Priti) 04/08/2021     COVID-19 Vaccine 18+ (Moderna) 11/20/2021, 05/05/2022     COVID-19 Vaccine Bivalent Booster 12+ (Pfizer) 09/25/2022     Influenza (IIV3) PF 11/15/2011     Influenza Vaccine >6 months (Bowen,Fluzone) 11/19/2021     Pneumo Conj  13-V (2010&after) 09/14/2017     Pneumococcal 23 valent 01/17/2013     TDAP Vaccine (Adacel) 02/20/2015          Chart documentation done in part with Dragon Voice recognition Software. Although reviewed after completion, some word and grammatical error may remain.    Roosevelt Flores MD

## 2022-12-26 ENCOUNTER — HEALTH MAINTENANCE LETTER (OUTPATIENT)
Age: 47
End: 2022-12-26

## 2023-05-16 ENCOUNTER — TELEPHONE (OUTPATIENT)
Dept: RHEUMATOLOGY | Facility: CLINIC | Age: 48
End: 2023-05-16
Payer: COMMERCIAL

## 2023-05-16 NOTE — TELEPHONE ENCOUNTER
PA Needed    Medication: HUMIRA  QTY/DS: 1 PER 21 DS  NEW INS: NO   Insurance Company: [facility]   Pharmacy Filling the Rx: [facility]   PA : 5/3/2023    Date of last fill: 2023

## 2023-05-16 NOTE — TELEPHONE ENCOUNTER
PA Initiation    Medication: Humira PA renewal - pending  Insurance Company: [employer] - Phone [phone] Fax [phone]  Pharmacy Filling the Rx: [facility] - [place], [place] - [address]  Filling Pharmacy Phone:    Filling Pharmacy Fax:    Start Date: 5/16/2023    Unable to complete on CMM. Faxed to [phone] and [phone].

## 2023-05-17 NOTE — TELEPHONE ENCOUNTER
Prior Authorization Approval    Medication:    Authorization Effective Date: 2/16/2023  Authorization Expiration Date: 5/17/2024  Approved Dose/Quantity: 1 pen per 21 days  Reference #:     Insurance Company: [employer] [facility] - Phone [phone] Fax [phone]  Expected CoPay:  $0  CoPay Card Available: None, Pmap plan  Financial Assistance Needed: None  Which Pharmacy is filling the prescription: [facility] - [place], [place] - [address]  Pharmacy Notified: Yes  Patient Notified: Yes

## 2023-06-14 ENCOUNTER — LAB (OUTPATIENT)
Dept: LAB | Facility: CLINIC | Age: 48
End: 2023-06-14
Payer: COMMERCIAL

## 2023-06-14 DIAGNOSIS — M02.30 REACTIVE ARTHRITIS, UNSPECIFIED SITE (H): ICD-10-CM

## 2023-06-14 LAB
ALBUMIN SERPL BCG-MCNC: 4.3 G/DL (ref 3.5–5.2)
ALP SERPL-CCNC: 46 U/L (ref 40–129)
ALT SERPL W P-5'-P-CCNC: 24 U/L (ref 0–70)
AST SERPL W P-5'-P-CCNC: 29 U/L (ref 0–45)
BASOPHILS # BLD AUTO: 0 10E3/UL (ref 0–0.2)
BASOPHILS NFR BLD AUTO: 1 %
BILIRUB DIRECT SERPL-MCNC: <0.2 MG/DL (ref 0–0.3)
BILIRUB SERPL-MCNC: 0.9 MG/DL
CREAT SERPL-MCNC: 0.95 MG/DL (ref 0.67–1.17)
CRP SERPL-MCNC: <3 MG/L
EOSINOPHIL # BLD AUTO: 0.2 10E3/UL (ref 0–0.7)
EOSINOPHIL NFR BLD AUTO: 4 %
ERYTHROCYTE [DISTWIDTH] IN BLOOD BY AUTOMATED COUNT: 12.7 % (ref 10–15)
ERYTHROCYTE [SEDIMENTATION RATE] IN BLOOD BY WESTERGREN METHOD: 6 MM/HR (ref 0–15)
GFR SERPL CREATININE-BSD FRML MDRD: >90 ML/MIN/1.73M2
HCT VFR BLD AUTO: 43.3 % (ref 40–53)
HGB BLD-MCNC: 14.7 G/DL (ref 13.3–17.7)
IMM GRANULOCYTES # BLD: 0 10E3/UL
IMM GRANULOCYTES NFR BLD: 0 %
LYMPHOCYTES # BLD AUTO: 2.6 10E3/UL (ref 0.8–5.3)
LYMPHOCYTES NFR BLD AUTO: 44 %
MCH RBC QN AUTO: 32 PG (ref 26.5–33)
MCHC RBC AUTO-ENTMCNC: 33.9 G/DL (ref 31.5–36.5)
MCV RBC AUTO: 94 FL (ref 78–100)
MONOCYTES # BLD AUTO: 0.4 10E3/UL (ref 0–1.3)
MONOCYTES NFR BLD AUTO: 8 %
NEUTROPHILS # BLD AUTO: 2.4 10E3/UL (ref 1.6–8.3)
NEUTROPHILS NFR BLD AUTO: 43 %
NRBC # BLD AUTO: 0 10E3/UL
NRBC BLD AUTO-RTO: 0 /100
PLATELET # BLD AUTO: 301 10E3/UL (ref 150–450)
PROT SERPL-MCNC: 7.2 G/DL (ref 6.4–8.3)
RBC # BLD AUTO: 4.6 10E6/UL (ref 4.4–5.9)
WBC # BLD AUTO: 5.7 10E3/UL (ref 4–11)

## 2023-06-14 PROCEDURE — 82565 ASSAY OF CREATININE: CPT

## 2023-06-14 PROCEDURE — 80076 HEPATIC FUNCTION PANEL: CPT

## 2023-06-14 PROCEDURE — 86140 C-REACTIVE PROTEIN: CPT

## 2023-06-14 PROCEDURE — 36415 COLL VENOUS BLD VENIPUNCTURE: CPT

## 2023-06-14 PROCEDURE — 85652 RBC SED RATE AUTOMATED: CPT

## 2023-06-14 PROCEDURE — 85025 COMPLETE CBC W/AUTO DIFF WBC: CPT

## 2023-07-13 ENCOUNTER — OFFICE VISIT (OUTPATIENT)
Dept: RHEUMATOLOGY | Facility: CLINIC | Age: 48
End: 2023-07-13
Payer: COMMERCIAL

## 2023-07-13 VITALS
SYSTOLIC BLOOD PRESSURE: 120 MMHG | OXYGEN SATURATION: 97 % | HEART RATE: 70 BPM | WEIGHT: 191.2 LBS | BODY MASS INDEX: 23.58 KG/M2 | DIASTOLIC BLOOD PRESSURE: 86 MMHG

## 2023-07-13 DIAGNOSIS — M02.30 REACTIVE ARTHRITIS, UNSPECIFIED SITE (H): Primary | ICD-10-CM

## 2023-07-13 PROCEDURE — 99214 OFFICE O/P EST MOD 30 MIN: CPT | Performed by: INTERNAL MEDICINE

## 2023-07-13 NOTE — PATIENT INSTRUCTIONS
RHEUMATOLOGY    [facility]  [address] [place] [address]  [place] [address]    Phone number: [phone]  Fax number: [phone]    If you need a medication refill, please contact us as you may need lab work and/or a follow up visit prior to your refill.      Thank you for choosing [facility]!    Ira Mosqueda CMA Rheumatology

## 2023-07-13 NOTE — PROGRESS NOTES
Rheumatology Clinic Visit      Viet Ohara MRN# [id number]   YOB: 1975 Age: 48 year old      Date of visit: 7/13/23   PCP: Dr. Trina Espinoza    Chief Complaint   Patient presents with:  Reactive arthritis    Assessment and Plan     1. Reactive arthritis: From chart review, he was in [place] where he developed a gastrointestinal illness and was later diagnosed with reactive arthritis with dactylitis in 2012. Per the patient, previous treatments include prednisone (ineffective), methotrexate (ineffective), sulfasalazine (ineffective), then Humira that was very effective.  At one point he discontinued Humira because he was doing well and all of the symptoms returned so he restarted Humira with resolution of the symptoms.  Currently taking Humira every 3 weeks, with mild symptoms returning if he extends the interval beyond this, with most recent return of symptoms when he held it for 5 weeks between doses in 2023.  Never had systemic symptoms such as fatigue.  Doing well at this time.  Continue Humira monotherapy.  Chronic illness, stable.    - Continue Humira 40 mg subcutaneous every 21 days  - Labs 2-3 days prior to follow-up in June 2024: CBC, Creatinine, Hepatic Panel, ESR, CRP    2.  Primary osteoarthritis of the right first CMC joint: Has improved from topical Voltaren gel and intra-articular injections.  Not an issue at this time.    Total minutes spent in evaluation with patient, documentation, , and review of pertinent studies and chart notes: 16    Mr. Ohara verbalized agreement with and understanding of the rational for the diagnosis and treatment plan.  All questions were answered to best of my ability and the patient's satisfaction. Mr. Ohara was advised to contact the clinic with any questions that may arise after the clinic visit.      Thank you for involving me in the care of the patient    Return to clinic: June 2024    HPI   Viet Ohara is a 48 year old male with a  medical history significant for reactive arthritis, rupture of plantaris tendon, and history of trigger finger who presents for follow-up of reactive arthritis.    5/17/2016: no show to scheduled clinic visit    6/14/2016: Mr. Ohara reported that prednisone was effective in the past but NSAIDs, methotrexate, and sulfasalazine were ineffective. Humira has been very effective. He discontinued Humira one point and all the symptoms returned. Later, he started taking Humira every 3 weeks instead of every 2 weeks and his symptoms returned.    5/16/2017: no show to scheduled clinic visit    9/14/2017: evaluated in clinic    8/9/2018: he reported doing very well with no joint pain when taking Humira at least every 4 weeks.  He tried changing the Humira to be every 3 weeks and felt well so changed Humira to every 4 weeks with no worsening symptoms.  He did not take humira for 6 weeks once and had more pain in his left hand that resolved with taking the next Humira dose.       8/8/2019: no show to scheduled clinic visit    9/20/2019: Mr. Ohara reports that he is doing well on Humira every 21 days.  If he waits that Humira every 4 weeks and has return of symptoms before the next shot.  Happy with how well he is doing and does not want to change anything.  No joint swelling or pain at this time.  No morning stiffness.    2/10/2021: no show to scheduled appointment    3/2/2021: Currently taking Humira q3 weeks and if delayed passes interval then he has return of symptoms.  Pain at his right thumb, where it hurts at the IP joint but if he palpates on the palmar aspect of the MCP joint near the A1 pulley then he has some tenderness and this has been an issue for about 1 month.  He says that this is similar to the trigger finger symptoms he had in the past, that was initial presentation of his reactive arthritis, per patient and injections were very helpful at that time but he prefers not to have injections because of the  associated pain with the injection itself but if absolutely needed then he would be okay with it.  Has ibuprofen at home that he finds effective for headaches.    11/19/2021: Humira every 3 weeks is very effective and if he goes for 4 weeks without Humira then he notices more joint pain.  Right first CMC joint aches with increased activity and improves with rest.  Has not tried treating his right first CMC joint pain.  Has not been to hand therapy.  No swelling of the right first CMC joint.    Today, 2/7/2022: He missed his appointment earlier today and was rescheduled for the afternoon.  Would like a steroid injection of the right first CMC joint where he has pain that is worse with activity and improves with rest.  He also notes having swelling of the right fifth PIP for about the past 10 days and he would like steroids for this.  Tolerating Humira well.  Other joints are doing well.  No fatigue.    3/4/2022: no show to scheduled appointment    8/19/2022: patient cancelled appointment.    12/20/2022: Currently doing well.  No joint pain or swelling.  No morning stiffness or gelling phenomenon.  Arthritis is not limiting his daily activities.  No fatigue.  No eye pain or redness.  No black or bloody stools.    6/20/2023: No-show to scheduled appointment.    Today, 7/13/2023: Patient reports that he is doing well with regard to arthritis.  No joint pain or swelling.  No morning stiffness or gelling phenomenon.  Instead of taking Humira every 3 weeks there was one time where he went 5 weeks before the next dose and during that time he had return of joint symptoms, primarily at the MCPs.  No injection site reactions.  Occasionally with situational anxiety; following with a therapist and he plans to speak with his primary care provider about having Xanax on hand as needed because he has found it to be helpful in the past.    Denies fevers, chills, nausea, vomiting, constipation, diarrhea. No abdominal pain.  No black  or bloody stools.  No chest pain/pressure, palpitations, or shortness of breath. No oral or nasal sores. No neck pain. No rash. No LE swelling.  No eye pain or redness.  No dry eye or dry mouth.    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[employer]; still working as a     ROS   12 point review of system was completed and negative except as noted in the HPI     Active Problem List     Patient Active Problem List   Diagnosis     CARDIOVASCULAR SCREENING; LDL GOAL LESS THAN 160     Trigger finger     Reactive arthritis (H)     Encounter for long-term current use of medication     Rupture of plantaris tendon     Baker's cyst of knee     High risk medication use     Past Medical History     Past Medical History:   Diagnosis Date     CARDIOVASCULAR SCREENING; LDL GOAL LESS THAN 160 10/31/2010     Reactive arthritis (H)      Trigger finger     Bilateral Hands that require periodic steroid shots     Past Surgical History     Past Surgical History:   Procedure Laterality Date     ZZC NONSPECIFIC PROCEDURE      multiple hand/ortho for trigger finger release x 4     Allergy     Allergies   Allergen Reactions     Nkda [No Known Drug Allergy]      Current Medication List     Current Outpatient Medications   Medication Sig     adalimumab (HUMIRA *CF*) 40 MG/0.4ML pen kit Inject 0.4 mLs (40 mg) Subcutaneous every 21 days . Refills at rheumatology follow-up appointments.     diclofenac (VOLTAREN) 1 % topical gel Apply up to 2 grams of 1% gel to hand up to 4 times daily as needed for joint pain (maximum: 8 g per upper extremity per day) (Patient not taking: Reported on 12/20/2022)     No current facility-administered medications for this visit.     Social History   See HPI    Family History     Family History   Problem Relation Age of Onset     Cancer Paternal Grandfather         lung +tob     C.A.D. No family hx of      Diabetes No family hx of      Hypertension  "No family hx of      Cerebrovascular Disease No family hx of      Connective Tissue Disorder No family hx of      Physical Exam     Temp Readings from Last 3 Encounters:   09/14/17 97.3  F (36.3  C) (Oral)   11/16/16 97.7  F (36.5  C)   06/14/16 98.3  F (36.8  C) (Oral)     BP Readings from Last 5 Encounters:   07/13/23 120/86   12/20/22 116/73   02/07/22 127/76   11/19/21 113/75   09/20/19 104/62     Pulse Readings from Last 1 Encounters:   07/13/23 70     Resp Readings from Last 1 Encounters:   08/09/18 14     Estimated body mass index is 23.58 kg/m  as calculated from the following:    Height as of 9/20/19: 1.918 m (6' 3.5\").    Weight as of this encounter: 86.7 kg (191 lb 3.2 oz).    GEN: NAD.   HEENT:  Anicteric, noninjected sclera. No obvious external lesions of the ear and nose. Hearing intact.  CV: S1, S2. RRR. No m/r/g  PULM: No increased work of breathing. CTA bilaterally   MSK: MCPs, PIPs, DIPs without swelling or tenderness to palpation.  Wrists without swelling or tenderness to palpation.  Elbows and shoulders without swelling or tenderness to palpation.  Knees, ankles, and MTPs without swelling or tenderness to palpation.  Achilles tendons nontender to palpation.  SKIN: No rash or jaundice seen  PSYCH: Alert. Appropriate.      Labs / Imaging (select studies)     CBC  Recent Labs   Lab Test 06/14/23  0902 08/08/22  0911 08/17/21  0910 09/20/19  0900 08/09/18  0902 09/14/17  0920   WBC 5.7 5.2 5.8 6.5 5.7 5.6   RBC 4.60 4.64 4.91 4.62 4.48 4.53   HGB 14.7 14.5 15.5 14.4 14.3 14.8   HCT 43.3 41.9 46.1 42.5 41.6 42.0   MCV 94 90 94 92 93 93   RDW 12.7 12.2 12.5 12.7 12.9 12.9    267 283 281 273 278   MCH 32.0 31.3 31.6 31.2 31.9 32.7   MCHC 33.9 34.6 33.6 33.9 34.4 35.2   NEUTROPHIL 43 43 52 49.0 45.7 42.9   LYMPH 44 45 36 37.8 41.2 44.4   MONOCYTE 8 8 9 8.8 8.8 9.1   EOSINOPHIL 4 3 2 3.9 3.9 3.2   BASOPHIL 1 1 0 0.5 0.4 0.4   ANEU  --   --   --  3.2 2.6 2.4   ALYM  --   --   --  2.5 2.4 2.5   BALWINDER  " --   --   --  0.6 0.5 0.5   AEOS  --   --   --  0.3 0.2 0.2   ABAS  --   --   --  0.0 0.0 0.0   ANEUTAUTO 2.4 2.2 3.0  --   --   --    ALYMPAUTO 2.6 2.3 2.1  --   --   --    AMONOAUTO 0.4 0.4 0.5  --   --   --    AEOSAUTO 0.2 0.2 0.1  --   --   --    ABSBASO 0.0 0.0 0.0  --   --   --      CMP  Recent Labs   Lab Test 06/14/23  0902 08/08/22  0911 08/17/21  0910 09/20/19  0900 08/09/18  0902 09/14/17  0920 06/14/16  1419   NA  --   --   --   --   --   --  138   POTASSIUM  --   --   --   --   --   --  3.9   CHLORIDE  --   --   --   --   --   --  105   CO2  --   --   --   --   --   --  24   ANIONGAP  --   --   --   --   --   --  9   GLC  --   --   --   --   --   --  90   BUN  --   --   --   --   --   --  11   CR 0.95 0.81 0.92 0.89 0.95 0.83 0.82   GFRESTIMATED >90 >90 >90 >90 87 >90 >90  Non African American GFR Calc     GFRESTBLACK  --   --   --  >90 >90 >90 >90  African American GFR Calc     LULU  --   --   --   --   --  8.8 8.4*   BILITOTAL 0.9 1.0 1.5* 0.8 1.3 1.2 1.4*   ALBUMIN 4.3 3.8 3.9 3.8 3.8 3.8 3.9   PROTTOTAL 7.2 7.3 8.0 7.3 7.4 7.4 7.8   ALKPHOS 46 51 48 49 51 50 53   AST 29 28 25 21 23 20 24   ALT 24 30 37 24 25 30 28     Calcium/VitaminD  Recent Labs   Lab Test 09/14/17  0920 06/14/16  1419   LULU 8.8 8.4*   VITDT 33  --      ESR/CRP  Recent Labs   Lab Test 06/14/23  0902 08/08/22  0911 08/17/21  0910 09/20/19  0900 08/09/18  0902   SED 6 6 8 8 7   CRP  --   --  <2.9 <2.9 <2.9   CRPI <3.00 <3.00  --   --   --      Hepatitis B  Recent Labs   Lab Test 06/14/16  1419 05/08/15  0928   HBCAB Nonreactive  --    HEPBANG Nonreactive Nonreactive     Hepatitis C  Recent Labs   Lab Test 06/14/16  1419 05/08/15  0928   HCVAB Nonreactive   Assay performance characteristics have not been established for newborns,   infants, and children   Nonreactive   Assay performance characteristics have not been established for newborns,   infants, and children       Tuberculosis Screening  Recent Labs   Lab Test 08/08/22  0911  08/17/21  0910 08/09/18  0902 06/14/16  1418 05/08/15  0930   TBRES Negative Negative Negative  --   --    TBRSLT  --   --   --  Indeterminate   Results are indeterminate, possible impaired immune response.  Consider   submitting a repeat sample in 1 to 2 months.  * Negative   TBAGN  --   --   --  0.00 0.01     HIV Screening  Recent Labs   Lab Test 06/14/16  1419   HIAGAB Nonreactive   HIV-1 p24 Ag & HIV-1/HIV-2 Ab Not Detected       Immunization History     Immunization History   Administered Date(s) Administered     COVID-19 Bivalent 12+ (Pfizer) 09/25/2022     COVID-19 Monovalent 18+ (Moderna) 11/20/2021, 05/05/2022     COVID-19 Vaccine (Priti) 04/08/2021     Influenza (IIV3) PF 11/15/2011     Influenza Vaccine >6 months (Alfuria,Fluzone) 11/19/2021     Pneumo Conj 13-V (2010&after) 09/14/2017     Pneumococcal 23 valent 01/17/2013     TDAP Vaccine (Adacel) 02/20/2015          Chart documentation done in part with Dragon Voice recognition Software. Although reviewed after completion, some word and grammatical error may remain.    Roosevelt Flores MD

## 2024-02-04 ENCOUNTER — HEALTH MAINTENANCE LETTER (OUTPATIENT)
Age: 49
End: 2024-02-04

## 2024-04-09 ENCOUNTER — TELEPHONE (OUTPATIENT)
Dept: RHEUMATOLOGY | Facility: CLINIC | Age: 49
End: 2024-04-09
Payer: COMMERCIAL

## 2024-04-09 NOTE — TELEPHONE ENCOUNTER
PA Initiation    Medication: HUMIRA *CF* PEN 40 MG/0.4ML SC PNKT  Insurance Company: [employer] ([facility]) - Phone [phone] Fax [phone]  Pharmacy Filling the Rx: [facility] - [place], [place] - [address]  Filling Pharmacy Phone: [phone]  Filling Pharmacy Fax: [phone]  Start Date: 4/9/2024  KRYSTIN (Key: [id number])    [email or web address]

## 2024-04-09 NOTE — TELEPHONE ENCOUNTER
PA Needed    Medication: HUMIRA  QTY/DS: 1 per 21 days  NEW INS: Yes  Insurance Company: [facility] [employer]  Pharmacy Filling the Rx: [facility]  PA : N/A  Date of last fill: 24   Rx for Linzess set as print, approved on 9/30, will call Rx to pharmacy.     Left Rx on voicemail at [facility].    Left message for patient to call clinic.

## 2024-04-10 NOTE — TELEPHONE ENCOUNTER
Prior Authorization Approval    Medication: HUMIRA *CF* PEN 40 MG/0.4ML SC PNKT  Authorization Effective Date: 4/10/2024  Authorization Expiration Date: 4/10/2025  Approved Dose/Quantity: plan limitation of 2 kit / 28 day  Reference #: [id number] (Key: [id number])   Insurance Company: [employer] ([facility]) - Phone [phone] Fax [phone]  Expected CoPay: $    CoPay Card Available: Other (see comments)    Financial Assistance Needed: [email or web address]   Which Pharmacy is filling the prescription: [facility] - [place], [place] - [address]   Pharmacy Notified: yes  Patient Notified: yes - MyChart + copay card sign up        Thank You,     Sofie Nash [facility]  Specialty Pharmacy Clinic [facility] Specialty  [email or web address]  [email or web address]  Phone: [phone]  Fax: [phone]

## 2024-04-10 NOTE — TELEPHONE ENCOUNTER
Sent question set to plan *urgent* request:        Thank You,     Sofie Nash CPhT  Specialty Pharmacy Clinic [facility] Specialty  [email or web address]  [email or web address]  Phone: [phone]  Fax: [phone]

## 2024-06-11 ENCOUNTER — LAB (OUTPATIENT)
Dept: LAB | Facility: CLINIC | Age: 49
End: 2024-06-11
Payer: COMMERCIAL

## 2024-06-11 DIAGNOSIS — M02.30 REACTIVE ARTHRITIS, UNSPECIFIED SITE (H): ICD-10-CM

## 2024-06-11 LAB
ALBUMIN SERPL BCG-MCNC: 4.5 G/DL (ref 3.5–5.2)
ALP SERPL-CCNC: 56 U/L (ref 40–150)
ALT SERPL W P-5'-P-CCNC: 30 U/L (ref 0–70)
AST SERPL W P-5'-P-CCNC: 32 U/L (ref 0–45)
BASOPHILS # BLD AUTO: 0 10E3/UL (ref 0–0.2)
BASOPHILS NFR BLD AUTO: 0 %
BILIRUB DIRECT SERPL-MCNC: <0.2 MG/DL (ref 0–0.3)
BILIRUB SERPL-MCNC: 1 MG/DL
CREAT SERPL-MCNC: 0.92 MG/DL (ref 0.67–1.17)
CRP SERPL-MCNC: <3 MG/L
EGFRCR SERPLBLD CKD-EPI 2021: >90 ML/MIN/1.73M2
EOSINOPHIL # BLD AUTO: 0.2 10E3/UL (ref 0–0.7)
EOSINOPHIL NFR BLD AUTO: 3 %
ERYTHROCYTE [DISTWIDTH] IN BLOOD BY AUTOMATED COUNT: 12.4 % (ref 10–15)
ERYTHROCYTE [SEDIMENTATION RATE] IN BLOOD BY WESTERGREN METHOD: 10 MM/HR (ref 0–15)
HCT VFR BLD AUTO: 44.1 % (ref 40–53)
HGB BLD-MCNC: 14.9 G/DL (ref 13.3–17.7)
IMM GRANULOCYTES # BLD: 0 10E3/UL
IMM GRANULOCYTES NFR BLD: 0 %
LYMPHOCYTES # BLD AUTO: 3.2 10E3/UL (ref 0.8–5.3)
LYMPHOCYTES NFR BLD AUTO: 48 %
MCH RBC QN AUTO: 31.2 PG (ref 26.5–33)
MCHC RBC AUTO-ENTMCNC: 33.8 G/DL (ref 31.5–36.5)
MCV RBC AUTO: 92 FL (ref 78–100)
MONOCYTES # BLD AUTO: 0.6 10E3/UL (ref 0–1.3)
MONOCYTES NFR BLD AUTO: 9 %
NEUTROPHILS # BLD AUTO: 2.6 10E3/UL (ref 1.6–8.3)
NEUTROPHILS NFR BLD AUTO: 39 %
PLATELET # BLD AUTO: 322 10E3/UL (ref 150–450)
PROT SERPL-MCNC: 8 G/DL (ref 6.4–8.3)
RBC # BLD AUTO: 4.78 10E6/UL (ref 4.4–5.9)
WBC # BLD AUTO: 6.7 10E3/UL (ref 4–11)

## 2024-06-11 PROCEDURE — 80076 HEPATIC FUNCTION PANEL: CPT

## 2024-06-11 PROCEDURE — 36415 COLL VENOUS BLD VENIPUNCTURE: CPT

## 2024-06-11 PROCEDURE — 85025 COMPLETE CBC W/AUTO DIFF WBC: CPT

## 2024-06-11 PROCEDURE — 85652 RBC SED RATE AUTOMATED: CPT

## 2024-06-11 PROCEDURE — 82565 ASSAY OF CREATININE: CPT

## 2024-06-11 PROCEDURE — 86140 C-REACTIVE PROTEIN: CPT

## 2024-06-12 ENCOUNTER — OFFICE VISIT (OUTPATIENT)
Dept: RHEUMATOLOGY | Facility: CLINIC | Age: 49
End: 2024-06-12
Payer: COMMERCIAL

## 2024-06-12 VITALS
DIASTOLIC BLOOD PRESSURE: 78 MMHG | BODY MASS INDEX: 23.61 KG/M2 | RESPIRATION RATE: 14 BRPM | OXYGEN SATURATION: 97 % | WEIGHT: 191.4 LBS | HEART RATE: 61 BPM | SYSTOLIC BLOOD PRESSURE: 134 MMHG

## 2024-06-12 DIAGNOSIS — M65.352 TRIGGER LITTLE FINGER OF LEFT HAND: ICD-10-CM

## 2024-06-12 DIAGNOSIS — M02.30 REACTIVE ARTHRITIS, UNSPECIFIED SITE (H): Primary | ICD-10-CM

## 2024-06-12 PROCEDURE — 20550 NJX 1 TENDON SHEATH/LIGAMENT: CPT | Mod: F4 | Performed by: INTERNAL MEDICINE

## 2024-06-12 PROCEDURE — 99214 OFFICE O/P EST MOD 30 MIN: CPT | Mod: 25 | Performed by: INTERNAL MEDICINE

## 2024-06-12 RX ORDER — METHYLPREDNISOLONE ACETATE 40 MG/ML
10 INJECTION, SUSPENSION INTRA-ARTICULAR; INTRALESIONAL; INTRAMUSCULAR; SOFT TISSUE ONCE
Status: COMPLETED | OUTPATIENT
Start: 2024-06-12 | End: 2024-06-12

## 2024-06-12 RX ADMIN — METHYLPREDNISOLONE ACETATE 10 MG: 40 INJECTION, SUSPENSION INTRA-ARTICULAR; INTRALESIONAL; INTRAMUSCULAR; SOFT TISSUE at 10:26

## 2024-06-12 NOTE — PATIENT INSTRUCTIONS
RHEUMATOLOGY    [facility] [place]  [address] [place] [address]  [place] [address]    Phone number: [phone]  Fax number: [phone]    If you need a medication refill, please contact us as you may need lab work and/or a follow up visit prior to your refill.      Thank you for choosing [facility]!    Ira Mosqueda CMA Rheumatology

## 2024-06-12 NOTE — PROGRESS NOTES
Rheumatology Clinic Visit      Veit Ohara MRN# [id number]   YOB: 1975 Age: 49 year old      Date of visit: 6/12/24   PCP: Dr. Trina Espinoza    Chief Complaint   Patient presents with:  Reactive arthritis: Would like an injection in left hand pinky, has been bothering him    Assessment and Plan     1. Reactive arthritis: From chart review, he was in [place] where he developed a gastrointestinal illness and was later diagnosed with reactive arthritis with dactylitis in 2012. Per the patient, previous treatments include prednisone (ineffective), methotrexate (ineffective), sulfasalazine (ineffective), then Humira that was very effective.  At one point he discontinued Humira because he was doing well and all of the symptoms returned so he restarted Humira with resolution of the symptoms.  Currently taking Humira every 3 weeks, with mild symptoms returning if he extends the interval beyond this, with most recent return of symptoms when he held it for a period of time about 2 months ago when changing insurances and he did not have coverage.  Since restarting Humira he has been doing better except for trigger finger of the left fifth finger; see #3.  Steroid injection for trigger finger.  If symptoms do not resolve within a few months then change Humira to be every 14 days in case it is arthritis related..  Chronic illness, stable.    - Continue Humira 40 mg subcutaneous every 21 days  - Labs 2-3 days prior to follow-up in June 2025: CBC, Creatinine, Hepatic Panel, ESR, CRP, QuantiFERON-TB gold plus    2.  Primary osteoarthritis of the right first CMC joint: Has improved from topical Voltaren gel and intra-articular injections.  Not an issue at this time.    3.  Left fifth digit trigger finger: At least 1 month duration.  Started after several missed doses of Humira.  Reviewed the diagnosis and treatment options.  He has had several trigger fingers in the past that have improved with steroid  injections and he would like a steroid injection of the left fifth finger today for trigger finger.  He has not had a steroid injection for left trigger finger in the past.  Steroid injection as documented in the procedure section.  Splint to the left fifth finger at night until 2 weeks past the date of symptom resolution    Total minutes spent in evaluation with patient, documentation, , and review of pertinent studies and chart notes: 22    Mr. Ohara verbalized agreement with and understanding of the rational for the diagnosis and treatment plan.  All questions were answered to best of my ability and the patient's satisfaction. Mr. Ohaar was advised to contact the clinic with any questions that may arise after the clinic visit.      Thank you for involving me in the care of the patient    Return to clinic: 1 year    HPI   Viet Ohara is a 49 year old male with a medical history significant for reactive arthritis, rupture of plantaris tendon, and history of trigger finger who presents for follow-up of reactive arthritis.    5/17/2016: no show to scheduled clinic visit    6/14/2016: Mr. Ohara reported that prednisone was effective in the past but NSAIDs, methotrexate, and sulfasalazine were ineffective. Humira has been very effective. He discontinued Humira one point and all the symptoms returned. Later, he started taking Humira every 3 weeks instead of every 2 weeks and his symptoms returned.    5/16/2017: no show to scheduled clinic visit    9/14/2017: evaluated in clinic    8/9/2018: he reported doing very well with no joint pain when taking Humira at least every 4 weeks.  He tried changing the Humira to be every 3 weeks and felt well so changed Humira to every 4 weeks with no worsening symptoms.  He did not take humira for 6 weeks once and had more pain in his left hand that resolved with taking the next Humira dose.       8/8/2019: no show to scheduled clinic visit    9/20/2019: Mr. Ohara  reports that he is doing well on Humira every 21 days.  If he waits that Humira every 4 weeks and has return of symptoms before the next shot.  Happy with how well he is doing and does not want to change anything.  No joint swelling or pain at this time.  No morning stiffness.    2/10/2021: no show to scheduled appointment    3/2/2021: Currently taking Humira q3 weeks and if delayed passes interval then he has return of symptoms.  Pain at his right thumb, where it hurts at the IP joint but if he palpates on the palmar aspect of the MCP joint near the A1 pulley then he has some tenderness and this has been an issue for about 1 month.  He says that this is similar to the trigger finger symptoms he had in the past, that was initial presentation of his reactive arthritis, per patient and injections were very helpful at that time but he prefers not to have injections because of the associated pain with the injection itself but if absolutely needed then he would be okay with it.  Has ibuprofen at home that he finds effective for headaches.    11/19/2021: Humira every 3 weeks is very effective and if he goes for 4 weeks without Humira then he notices more joint pain.  Right first CMC joint aches with increased activity and improves with rest.  Has not tried treating his right first CMC joint pain.  Has not been to hand therapy.  No swelling of the right first CMC joint.    Today, 2/7/2022: He missed his appointment earlier today and was rescheduled for the afternoon.  Would like a steroid injection of the right first CMC joint where he has pain that is worse with activity and improves with rest.  He also notes having swelling of the right fifth PIP for about the past 10 days and he would like steroids for this.  Tolerating Humira well.  Other joints are doing well.  No fatigue.    3/4/2022: no show to scheduled appointment    8/19/2022: patient cancelled appointment.    12/20/2022: Currently doing well.  No joint pain or  swelling.  No morning stiffness or gelling phenomenon.  Arthritis is not limiting his daily activities.  No fatigue.  No eye pain or redness.  No black or bloody stools.    6/20/2023: No-show to scheduled appointment.    7/13/2023: Patient reports that he is doing well with regard to arthritis.  No joint pain or swelling.  No morning stiffness or gelling phenomenon.  Instead of taking Humira every 3 weeks there was one time where he went 5 weeks before the next dose and during that time he had return of joint symptoms, primarily at the MCPs.  No injection site reactions.  Occasionally with situational anxiety; following with a therapist and he plans to speak with his primary care provider about having Xanax on hand as needed because he has found it to be helpful in the past.    Today, 6/13/2024: Missed a couple months of Humira recently because of an insurance change and not having coverage for a period of time.  Then starting about 1-2 months ago with left fifth digit trigger finger and he like a steroid injection for this today.  He recalls having many trigger fingers in the past prior to the diagnosis of a spondylarthritis.    Denies fevers, chills, nausea, vomiting, constipation, diarrhea. No abdominal pain.  No black or bloody stools.  No chest pain/pressure, palpitations, or shortness of breath. No oral or nasal sores. No neck pain. No rash. No LE swelling.  No eye pain or redness.  No dry eye or dry mouth.    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[employer]; still working as a     ROS   12 point review of system was completed and negative except as noted in the HPI     Active Problem List     Patient Active Problem List   Diagnosis    CARDIOVASCULAR SCREENING; LDL GOAL LESS THAN 160    Trigger finger    Reactive arthritis (H)    Encounter for long-term current use of medication    Rupture of plantaris tendon    Baker's cyst of knee    High  "risk medication use     Past Medical History     Past Medical History:   Diagnosis Date    CARDIOVASCULAR SCREENING; LDL GOAL LESS THAN 160 10/31/2010    Reactive arthritis (H)     Trigger finger     Bilateral Hands that require periodic steroid shots     Past Surgical History     Past Surgical History:   Procedure Laterality Date    ZZC NONSPECIFIC PROCEDURE      multiple hand/ortho for trigger finger release x 4     Allergy     Allergies   Allergen Reactions    Nkda [No Known Drug Allergy]      Current Medication List     Current Outpatient Medications   Medication Sig Dispense Refill    adalimumab (HUMIRA *CF*) 40 MG/0.4ML pen kit Inject 0.4 mLs (40 mg) Subcutaneous every 21 days . Hold for infection and seek medical attention.  Refills at rheumatology follow-up appointments. 0.8 mL 8    diclofenac (VOLTAREN) 1 % topical gel Apply up to 2 grams of 1% gel to hand up to 4 times daily as needed for joint pain (maximum: 8 g per upper extremity per day) (Patient not taking: Reported on 12/20/2022) 200 g 3     No current facility-administered medications for this visit.     Social History   See HPI    Family History     Family History   Problem Relation Age of Onset    Cancer Paternal Grandfather         lung +tob    C.A.D. No family hx of     Diabetes No family hx of     Hypertension No family hx of     Cerebrovascular Disease No family hx of     Connective Tissue Disorder No family hx of      Physical Exam     Temp Readings from Last 3 Encounters:   09/14/17 97.3  F (36.3  C) (Oral)   11/16/16 97.7  F (36.5  C)   06/14/16 98.3  F (36.8  C) (Oral)     BP Readings from Last 5 Encounters:   06/12/24 134/78   07/13/23 120/86   12/20/22 116/73   02/07/22 127/76   11/19/21 113/75     Pulse Readings from Last 1 Encounters:   06/12/24 61     Resp Readings from Last 1 Encounters:   06/12/24 14     Estimated body mass index is 23.61 kg/m  as calculated from the following:    Height as of 9/20/19: 1.918 m (6' 3.5\").    Weight " as of this encounter: 86.8 kg (191 lb 6.4 oz).    GEN: NAD.   HEENT:  Anicteric, noninjected sclera. No obvious external lesions of the ear and nose. Hearing intact.  CV: S1, S2. RRR. No m/r/g  PULM: No increased work of breathing. CTA bilaterally   MSK: MCPs, PIPs, DIPs without swelling or tenderness to palpation.  Triggering of the left fifth finger with palpable bump near the A1 pulley that moves with flexion extension of the finger.  Wrists without swelling or tenderness to palpation.  Elbows and shoulders without swelling or tenderness to palpation.  Knees, ankles, and MTPs without swelling or tenderness to palpation.  Achilles tendons nontender to palpation.  SKIN: No rash or jaundice seen  PSYCH: Alert. Appropriate.      Labs / Imaging (select studies)     CBC  Recent Labs   Lab Test 06/11/24  1202 06/14/23  0902 08/08/22  0911 08/17/21  0910 09/20/19  0900 08/09/18  0902 09/14/17  0920   WBC 6.7 5.7 5.2   < > 6.5 5.7 5.6   RBC 4.78 4.60 4.64   < > 4.62 4.48 4.53   HGB 14.9 14.7 14.5   < > 14.4 14.3 14.8   HCT 44.1 43.3 41.9   < > 42.5 41.6 42.0   MCV 92 94 90   < > 92 93 93   RDW 12.4 12.7 12.2   < > 12.7 12.9 12.9    301 267   < > 281 273 278   MCH 31.2 32.0 31.3   < > 31.2 31.9 32.7   MCHC 33.8 33.9 34.6   < > 33.9 34.4 35.2   NEUTROPHIL 39 43 43   < > 49.0 45.7 42.9   LYMPH 48 44 45   < > 37.8 41.2 44.4   MONOCYTE 9 8 8   < > 8.8 8.8 9.1   EOSINOPHIL 3 4 3   < > 3.9 3.9 3.2   BASOPHIL 0 1 1   < > 0.5 0.4 0.4   ANEU  --   --   --   --  3.2 2.6 2.4   ALYM  --   --   --   --  2.5 2.4 2.5   BALWINDER  --   --   --   --  0.6 0.5 0.5   AEOS  --   --   --   --  0.3 0.2 0.2   ABAS  --   --   --   --  0.0 0.0 0.0   ANEUTAUTO 2.6 2.4 2.2   < >  --   --   --    ALYMPAUTO 3.2 2.6 2.3   < >  --   --   --    AMONOAUTO 0.6 0.4 0.4   < >  --   --   --    AEOSAUTO 0.2 0.2 0.2   < >  --   --   --    ABSBASO 0.0 0.0 0.0   < >  --   --   --     < > = values in this interval not displayed.     CMP  Recent Labs   Lab Test  06/11/24  1202 06/14/23  0902 08/08/22  0911 08/17/21  0910 09/20/19  0900 08/09/18  0902 09/14/17  0920 06/14/16  1419   NA  --   --   --   --   --   --   --  138   POTASSIUM  --   --   --   --   --   --   --  3.9   CHLORIDE  --   --   --   --   --   --   --  105   CO2  --   --   --   --   --   --   --  24   ANIONGAP  --   --   --   --   --   --   --  9   GLC  --   --   --   --   --   --   --  90   BUN  --   --   --   --   --   --   --  11   CR 0.92 0.95 0.81   < > 0.89 0.95 0.83 0.82   GFRESTIMATED >90 >90 >90   < > >90 87 >90 >90  Non African American GFR Calc     GFRESTBLACK  --   --   --   --  >90 >90 >90 >90  African American GFR Calc     LULU  --   --   --   --   --   --  8.8 8.4*   BILITOTAL 1.0 0.9 1.0   < > 0.8 1.3 1.2 1.4*   ALBUMIN 4.5 4.3 3.8   < > 3.8 3.8 3.8 3.9   PROTTOTAL 8.0 7.2 7.3   < > 7.3 7.4 7.4 7.8   ALKPHOS 56 46 51   < > 49 51 50 53   AST 32 29 28   < > 21 23 20 24   ALT 30 24 30   < > 24 25 30 28    < > = values in this interval not displayed.     Calcium/VitaminD  Recent Labs   Lab Test 09/14/17 0920 06/14/16  1419   LULU 8.8 8.4*   VITDT 33  --      ESR/CRP  Recent Labs   Lab Test 06/11/24  1202 06/14/23  0902 08/08/22 0911 08/17/21  0910 08/17/21  0910 09/20/19  0900 08/09/18  0902   SED 10 6 6   < > 8 8 7   CRP  --   --   --   --  <2.9 <2.9 <2.9   CRPI <3.00 <3.00 <3.00  --   --   --   --     < > = values in this interval not displayed.     Hepatitis B  Recent Labs   Lab Test 06/14/16  1419   HBCAB Nonreactive   HEPBANG Nonreactive     Hepatitis C  Recent Labs   Lab Test 06/14/16  1419   HCVAB Nonreactive   Assay performance characteristics have not been established for newborns,   infants, and children       Tuberculosis Screening  Recent Labs   Lab Test 08/08/22  0911 08/17/21  0910 08/09/18  0902 06/14/16  1418   TBRES Negative Negative Negative  --    TBRSLT  --   --   --  Indeterminate   Results are indeterminate, possible impaired immune response.  Consider   submitting a repeat  sample in 1 to 2 months.  *   TBAGN  --   --   --  0.00     HIV Screening  Recent Labs   Lab Test 06/14/16  1419   HIAGAB Nonreactive   HIV-1 p24 Ag & HIV-1/HIV-2 Ab Not Detected       Immunization History     Immunization History   Administered Date(s) Administered    COVID-19 12+ (2023-24) (MODERNA) 10/11/2023    COVID-19 Bivalent 12+ (Pfizer) 09/25/2022    COVID-19 Monovalent 18+ (Moderna) 11/20/2021, 05/05/2022    COVID-19 Vaccine (Priti) 04/08/2021    Influenza (IIV3) PF 11/15/2011    Influenza Vaccine >6 months,quad, PF 11/19/2021    Pneumo Conj 13-V (2010&after) 09/14/2017    Pneumococcal 23 valent 01/17/2013    TDAP Vaccine (Adacel) 02/20/2015     Procedure     Procedure: Trigger finger injection  Indication: Trigger finger / pain    The procedure was explained in detail. Risks including infection, pain, structural damage such as cartilage damage and tendon rupture, and medication reaction were explained. The option of not doing the procedure was also provided. All questions were answered and the patient consented to the procedure.     A time-out was performed and the correct patient, procedure, and laterality were verified.    The left fifth digit was examined and location for injection was identified to be on the palmar aspect of the left hand just proximal to the fifth MCP at the location of the A1 pulley. The area was cleaned with chlorhexidine, twice.  Ethyl chloride was then used for topical anaesthetic. Then a mixture of lidocaine 1% 0.05mL and Depo-Medrol 10mg (0.25mL) was injected near the tendon sheath at the A1 pulley.     The patient tolerated the procedure well. No complications.     1% Lidocaine  : Hospira  Lot #: [id number]  EXPIRATION DATE: 01 MAR 2025  NDC: 7418-4037-38    MEDICATION: Methylprednisolone  LOT #: [id number]  EXPIRATION DATE:  2025-05  NDC#: [address]-3   10 mg was used for injection.  30 mg was disposed.         Chart documentation done in part with Dragon Voice  recognition Software. Although reviewed after completion, some word and grammatical error may remain.    Roosevelt Flores MD

## 2024-06-12 NOTE — NURSING NOTE
RAPID3 (0-30) Cumulative Score  15.7          RAPID3 Weighted Score (divide #4 by 3 and that is the weighted score)  5.2          No pertinent family history in first degree relatives

## 2024-09-26 ENCOUNTER — IMMUNIZATION (OUTPATIENT)
Dept: FAMILY MEDICINE | Facility: CLINIC | Age: 49
End: 2024-09-26
Payer: COMMERCIAL

## 2024-09-26 PROCEDURE — 91320 SARSCV2 VAC 30MCG TRS-SUC IM: CPT

## 2024-09-26 PROCEDURE — 90471 IMMUNIZATION ADMIN: CPT

## 2024-09-26 PROCEDURE — 90656 IIV3 VACC NO PRSV 0.5 ML IM: CPT

## 2024-09-26 PROCEDURE — 90480 ADMN SARSCOV2 VAC 1/ONLY CMP: CPT

## 2024-11-06 ENCOUNTER — OFFICE VISIT (OUTPATIENT)
Dept: URGENT CARE | Facility: URGENT CARE | Age: 49
End: 2024-11-06
Payer: COMMERCIAL

## 2024-11-06 VITALS
SYSTOLIC BLOOD PRESSURE: 135 MMHG | TEMPERATURE: 97.5 F | RESPIRATION RATE: 16 BRPM | HEART RATE: 57 BPM | OXYGEN SATURATION: 98 % | DIASTOLIC BLOOD PRESSURE: 89 MMHG

## 2024-11-06 DIAGNOSIS — F41.0 ANXIETY ATTACK: Primary | ICD-10-CM

## 2024-11-06 PROCEDURE — 99214 OFFICE O/P EST MOD 30 MIN: CPT | Performed by: NURSE PRACTITIONER

## 2024-11-06 RX ORDER — LORAZEPAM 0.5 MG/1
0.5 TABLET ORAL EVERY 8 HOURS PRN
Qty: 10 TABLET | Refills: 0 | Status: SHIPPED | OUTPATIENT
Start: 2024-11-06

## 2024-11-06 ASSESSMENT — ANXIETY QUESTIONNAIRES
5. BEING SO RESTLESS THAT IT IS HARD TO SIT STILL: MORE THAN HALF THE DAYS
GAD7 TOTAL SCORE: 15
IF YOU CHECKED OFF ANY PROBLEMS ON THIS QUESTIONNAIRE, HOW DIFFICULT HAVE THESE PROBLEMS MADE IT FOR YOU TO DO YOUR WORK, TAKE CARE OF THINGS AT HOME, OR GET ALONG WITH OTHER PEOPLE: VERY DIFFICULT
2. NOT BEING ABLE TO STOP OR CONTROL WORRYING: MORE THAN HALF THE DAYS
7. FEELING AFRAID AS IF SOMETHING AWFUL MIGHT HAPPEN: NEARLY EVERY DAY
1. FEELING NERVOUS, ANXIOUS, OR ON EDGE: MORE THAN HALF THE DAYS
GAD7 TOTAL SCORE: 15
6. BECOMING EASILY ANNOYED OR IRRITABLE: MORE THAN HALF THE DAYS
3. WORRYING TOO MUCH ABOUT DIFFERENT THINGS: MORE THAN HALF THE DAYS

## 2024-11-06 ASSESSMENT — PATIENT HEALTH QUESTIONNAIRE - PHQ9: 5. POOR APPETITE OR OVEREATING: MORE THAN HALF THE DAYS

## 2024-11-06 NOTE — PROGRESS NOTES
Chief Complaint   Patient presents with    Urgent Care    Anxiety    Palpitations     Patient presents with anxiety that is causing palpitations.      SUBJECTIVE:  Viet Ohara is a 49 year old male presenting with request for anxiety meds for situational anxiety caused by the recent political state / presidential election.  Patient came out of his room upset about the wait time questioning staff in the hallway.  I apologized for the wait time and assured him that we go in the order of each patient's arrival.  There are 2 patients ahead of him and he can wait if he wishes to.  We are doing the best that we can to keep patients safe.  During the visit he was not very interested in discussing his anxiety further, just had the adamant request for anxiolytics that have worked for him in the past - ativan.  I brought up hydroxyzine and SSRIs and he declined.  He states that it is mostly a placebo effect just to have them on hand to make him feel better.  He declines baseline major anxiety depression any thoughts of harm.  States that he feels chest tightness palpitations anxious jittery as if he is not able to do anything.  No current chest pain shortness of breath diaphoresis left arm involvement or relevant cardiopulmonary history.  No concerns of thyroid issues.    [place] PDMP checked and no controlled substances within the last year.    Past Medical History:   Diagnosis Date    CARDIOVASCULAR SCREENING; LDL GOAL LESS THAN 160 10/31/2010    Reactive arthritis (H)     Trigger finger     Bilateral Hands that require periodic steroid shots     Current Outpatient Medications   Medication Sig Dispense Refill    adalimumab (HUMIRA *CF*) 40 MG/0.4ML pen kit Inject 0.4 mLs (40 mg) Subcutaneous every 21 days . Hold for infection and seek medical attention.  Refills at rheumatology follow-up appointments. 0.8 mL 8    LORazepam (ATIVAN) 0.5 MG tablet Take 1 tablet (0.5 mg) by mouth every 8 hours as needed for anxiety. 10  tablet 0    diclofenac (VOLTAREN) 1 % topical gel Apply up to 2 grams of 1% gel to hand up to 4 times daily as needed for joint pain (maximum: 8 g per upper extremity per day) (Patient not taking: Reported on 11/6/2024) 200 g 3     No current facility-administered medications for this visit.     Social History     Tobacco Use    Smoking status: Never    Smokeless tobacco: Never   Substance Use Topics    Alcohol use: Yes     Alcohol/week: 4.2 standard drinks of alcohol     Types: 5 Standard drinks or equivalent per week     Comment: 1 beer a day     Allergies   Allergen Reactions    Nkda [No Known Drug Allergy]        Review of Systems  Negative except for those listed above in HPI.    OBJECTIVE:   /89 (BP Location: Right arm)   Pulse 57   Temp 97.5  F (36.4  C) (Temporal)   Resp 16   SpO2 98%     Physical Exam  Vitals reviewed.   Constitutional:       General: He is in acute distress.      Appearance: Normal appearance.   HENT:      Head: Normocephalic and atraumatic.      Nose: Nose normal.      Mouth/Throat:      Mouth: Mucous membranes are moist.   Eyes:      Extraocular Movements: Extraocular movements intact.      Conjunctiva/sclera: Conjunctivae normal.      Pupils: Pupils are equal, round, and reactive to light.   Cardiovascular:      Rate and Rhythm: Normal rate.      Pulses: Normal pulses.   Pulmonary:      Effort: Pulmonary effort is normal. No respiratory distress.      Breath sounds: No stridor. No wheezing.   Musculoskeletal:         General: Normal range of motion.      Cervical back: Normal range of motion.   Skin:     General: Skin is warm and dry.      Findings: No rash.   Neurological:      General: No focal deficit present.      Mental Status: He is alert and oriented to person, place, and time.      Motor: No weakness.      Coordination: Coordination normal.      Gait: Gait normal.       ASSESSMENT:    ICD-10-CM    1. Anxiety attack  F41.0 LORazepam (ATIVAN) 0.5 MG tablet        PLAN:      Okay for short course of Ativan per adamant request  Can even cut the pill in half, use caution with sedation addiction  Patient not interested in SSRIs or hydroxyzine  Discussed other potential components such as cardiopulmonary conditions as well as thyroid metabolic issues  Patient declines concerns warranting workup  Mental health care is not within the scope of urgent care and better managed by primary care provider  If patient has acute worsening distress chest pain shortness of breath left arm involvement nausea vomiting sweats dizziness needs to present to the ER    Follow up with primary care provider with any problems, questions or concerns or if symptoms worsen or fail to improve. Patient agreed to plan and verbalized understanding.    Donna Wood, ANNAP-BC  [facility] URGENT CARE [place]

## 2025-03-02 ENCOUNTER — HEALTH MAINTENANCE LETTER (OUTPATIENT)
Age: 50
End: 2025-03-02

## 2025-03-26 ENCOUNTER — TELEPHONE (OUTPATIENT)
Dept: RHEUMATOLOGY | Facility: CLINIC | Age: 50
End: 2025-03-26
Payer: COMMERCIAL

## 2025-03-26 NOTE — TELEPHONE ENCOUNTER
PA Initiation    Medication: HUMIRA (2 PEN) 40 MG/0.4ML SC AJKT  Insurance Company: [facility] - Phone [phone] Fax [phone]  Pharmacy Filling the Rx: [facility] - [place], [place] - [facility] [address]   Filling Pharmacy Phone: [phone]  Filling Pharmacy Fax: [phone]  Start Date: 3/26/2025  KRYSTIN (Key: [id number])  PA Case ID #: [id number]        Thank You,     Sofie Nash CPhT  Specialty Pharmacy Clinic [facility] Specialty  [email or web address]  [email or web address]  Phone: [phone]  Fax: [phone]

## 2025-03-26 NOTE — TELEPHONE ENCOUNTER
PA Initiation    Medication: HUMIRA (2 PEN) 40 MG/0.4ML SC AJKT  Insurance Company: [employer] - Phone [phone] Fax [phone]  Pharmacy Filling the Rx: [facility] - [place], [place] - [facility] [address]   Filling Pharmacy Phone: [phone]  Filling Pharmacy Fax: [phone]  Start Date: 3/26/2025  KRYSTIN (Key: [id number])        Thank You,     Sofie Nash [facility]  Specialty Pharmacy Clinic [facility] Specialty  [email or web address]  [email or web address]  Phone: [phone]  Fax: [phone]     Yes

## 2025-03-27 NOTE — TELEPHONE ENCOUNTER
Prior Authorization Approval    Medication: HUMIRA (2 PEN) 40 MG/0.4ML SC AJKT  Authorization Effective Date: 1/1/2025  Authorization Expiration Date: 3/26/2026  Approved Dose/Quantity: up to 2 pen kit per 28-day supply  Reference #: KRYSTIN (Key: [id number])   Insurance Company: [employer] - Phone [phone] Fax [phone]  Expected CoPay: $ 25  CoPay Card Available: No    Financial Assistance Needed: N/A - [facility]  Which Pharmacy is filling the prescription: [facility] - [facility] [address] [place] [address]  Pharmacy Notified: yes - ePA + ERx note  Patient Notified: yes - MyChart          Thank You,     Froilan Nash Sissy  Specialty Pharmacy Clinic [facility] Specialty  [email or web address]  [email or web address]  Phone: [phone]  Fax: [phone]

## 2025-03-27 NOTE — TELEPHONE ENCOUNTER
Prior Authorization Approval    Medication: HUMIRA (2 PEN) 40 MG/0.4ML SC AJKT  Authorization Effective Date: 3/26/2025  Authorization Expiration Date: 3/26/2026  Approved Dose/Quantity: up to 2 pen kit per 28-day supply  Reference #: [id number] (Key: [id number])   Insurance Company: [facility] - Phone [phone] Fax [phone]  Expected CoPay: $ 25  CoPay Card Available: No    Financial Assistance Needed: N/A - BLUE PLUS PMAP SECONDARY  Which Pharmacy is filling the prescription: [facility] - [place], [place] - [address] [place] [address] [facility]  Pharmacy Notified: yes - ePA renewal  Patient Notified: renewal        Thank You,     Sofie Nash [facility]  [facility]  [email or web address]  [email or web address]  Phone: [phone]  Fax: [phone]

## 2025-06-02 ENCOUNTER — OFFICE VISIT (OUTPATIENT)
Dept: URGENT CARE | Facility: URGENT CARE | Age: 50
End: 2025-06-02
Payer: COMMERCIAL

## 2025-06-02 VITALS
BODY MASS INDEX: 23.75 KG/M2 | TEMPERATURE: 98.6 F | WEIGHT: 191 LBS | HEART RATE: 57 BPM | OXYGEN SATURATION: 96 % | DIASTOLIC BLOOD PRESSURE: 78 MMHG | RESPIRATION RATE: 16 BRPM | SYSTOLIC BLOOD PRESSURE: 123 MMHG | HEIGHT: 75 IN

## 2025-06-02 DIAGNOSIS — T14.8XXA FOREIGN BODY IN SKIN: Primary | ICD-10-CM

## 2025-06-02 PROCEDURE — 3074F SYST BP LT 130 MM HG: CPT | Performed by: PHYSICIAN ASSISTANT

## 2025-06-02 PROCEDURE — 99214 OFFICE O/P EST MOD 30 MIN: CPT | Performed by: PHYSICIAN ASSISTANT

## 2025-06-02 PROCEDURE — 3078F DIAST BP <80 MM HG: CPT | Performed by: PHYSICIAN ASSISTANT

## 2025-06-02 NOTE — PROGRESS NOTES
Foreign body in skin  - REMOVE FOREIGN BODY SIMPLE    Area was prepped and cleaned with Betadine swabs and isopropyl alcohol.  3 mL of 2% without epi was used for good anesthesia.  A size 11 blade was used to make a small incision over the puncture wound and splinter forceps were used to locate and remove a small wooden splinter from the patient's skin.  Pressure was used to slow bleeding and bacitracin was placed as well as a compression dressing.  Patient tolerated procedure well.  Patient was educated on wound care instructions and asked to monitor for signs of infection and return to clinic if they present.    Patient educated on red flag symptoms and asked to go directly to the ED if these symptoms present themselves.     30 minutes spent by me on the date of the encounter doing chart review, history and exam, documentation and further activities per the note    Niles Greenfield PA-C  [facility]    Subjective   50 year old who presents to clinic today for the following health issues:    Foreign Body in Skin       HPI     Splinter in palm of right hand, its deep, hurts, sore to the touch. Patient is confident that the splinter is still in there and he states that he has been having gradually worsening soreness but no warmth or discharge from the area. Patient denies doing any epsom salt soaks. Patient states that he used some topical neosporin with numbing  last night. Patient denies fevers, chills, body aches, or decreased ROM.     Review of Systems   Review of Systems   See HPI    Objective    Temp: 98.6  F (37  C) Temp src: Temporal BP: 123/78 Pulse: 57   Resp: 16 SpO2: 96 %       Physical Exam   Physical Exam  Constitutional:       General: He is not in acute distress.     Appearance: Normal appearance. He is normal weight. He is not ill-appearing, toxic-appearing or diaphoretic.   HENT:      Head: Normocephalic and atraumatic.   Cardiovascular:      Rate and Rhythm: Normal rate.       Pulses: Normal pulses.   Pulmonary:      Effort: Pulmonary effort is normal. No respiratory distress.   Musculoskeletal:        Hands:       Comments: Patient has a small partially healed puncture wound in the area shown above with no clear foreign body underneath it.  The area is moderately tender to touch.  There is no erythema, warmth, or discharge at this time.  Patient has full range of motion of his fingers.   Neurological:      General: No focal deficit present.      Mental Status: He is alert and oriented to person, place, and time. Mental status is at baseline.      Gait: Gait normal.   Psychiatric:         Mood and Affect: Mood normal.         Behavior: Behavior normal.         Thought Content: Thought content normal.         Judgment: Judgment normal.          No results found for this or any previous visit (from the past 24 hours).

## 2025-06-02 NOTE — PROGRESS NOTES
Urgent Care Clinic Visit                6/2/2025    10:05 AM   Additional Questions   Roomed by Kayla   Accompanied by alone

## 2025-06-11 ENCOUNTER — OFFICE VISIT (OUTPATIENT)
Dept: RHEUMATOLOGY | Facility: CLINIC | Age: 50
End: 2025-06-11
Payer: COMMERCIAL

## 2025-06-11 VITALS — SYSTOLIC BLOOD PRESSURE: 115 MMHG | HEART RATE: 59 BPM | OXYGEN SATURATION: 100 % | DIASTOLIC BLOOD PRESSURE: 76 MMHG

## 2025-06-11 DIAGNOSIS — M02.30 REACTIVE ARTHRITIS, UNSPECIFIED SITE (H): Primary | ICD-10-CM

## 2025-06-11 LAB
ALBUMIN SERPL BCG-MCNC: 4.2 G/DL (ref 3.5–5.2)
ALP SERPL-CCNC: 50 U/L (ref 40–150)
ALT SERPL W P-5'-P-CCNC: 26 U/L (ref 0–70)
AST SERPL W P-5'-P-CCNC: 30 U/L (ref 0–45)
BASOPHILS # BLD AUTO: 0 10E3/UL (ref 0–0.2)
BASOPHILS NFR BLD AUTO: 1 %
BILIRUB SERPL-MCNC: 1.1 MG/DL
BILIRUBIN DIRECT (ROCHE PRO & PURE): 0.32 MG/DL (ref 0–0.45)
CREAT SERPL-MCNC: 0.97 MG/DL (ref 0.67–1.17)
CRP SERPL-MCNC: <3 MG/L
EGFRCR SERPLBLD CKD-EPI 2021: >90 ML/MIN/1.73M2
EOSINOPHIL # BLD AUTO: 0.2 10E3/UL (ref 0–0.7)
EOSINOPHIL NFR BLD AUTO: 4 %
ERYTHROCYTE [DISTWIDTH] IN BLOOD BY AUTOMATED COUNT: 12.2 % (ref 10–15)
ERYTHROCYTE [SEDIMENTATION RATE] IN BLOOD BY WESTERGREN METHOD: 9 MM/HR (ref 0–15)
HBV CORE AB SERPL QL IA: NONREACTIVE
HBV SURFACE AG SERPL QL IA: NONREACTIVE
HCT VFR BLD AUTO: 43.2 % (ref 40–53)
HCV AB SERPL QL IA: NONREACTIVE
HGB BLD-MCNC: 14.5 G/DL (ref 13.3–17.7)
IMM GRANULOCYTES # BLD: 0 10E3/UL
IMM GRANULOCYTES NFR BLD: 0 %
LYMPHOCYTES # BLD AUTO: 2.4 10E3/UL (ref 0.8–5.3)
LYMPHOCYTES NFR BLD AUTO: 42 %
MCH RBC QN AUTO: 31.3 PG (ref 26.5–33)
MCHC RBC AUTO-ENTMCNC: 33.6 G/DL (ref 31.5–36.5)
MCV RBC AUTO: 93 FL (ref 78–100)
MONOCYTES # BLD AUTO: 0.5 10E3/UL (ref 0–1.3)
MONOCYTES NFR BLD AUTO: 8 %
NEUTROPHILS # BLD AUTO: 2.6 10E3/UL (ref 1.6–8.3)
NEUTROPHILS NFR BLD AUTO: 46 %
PLATELET # BLD AUTO: 310 10E3/UL (ref 150–450)
PROT SERPL-MCNC: 7.2 G/DL (ref 6.4–8.3)
RBC # BLD AUTO: 4.64 10E6/UL (ref 4.4–5.9)
WBC # BLD AUTO: 5.7 10E3/UL (ref 4–11)

## 2025-06-11 PROCEDURE — 85652 RBC SED RATE AUTOMATED: CPT | Performed by: INTERNAL MEDICINE

## 2025-06-11 PROCEDURE — G2211 COMPLEX E/M VISIT ADD ON: HCPCS | Performed by: INTERNAL MEDICINE

## 2025-06-11 PROCEDURE — 82565 ASSAY OF CREATININE: CPT | Performed by: INTERNAL MEDICINE

## 2025-06-11 PROCEDURE — 86140 C-REACTIVE PROTEIN: CPT | Performed by: INTERNAL MEDICINE

## 2025-06-11 PROCEDURE — 80076 HEPATIC FUNCTION PANEL: CPT | Performed by: INTERNAL MEDICINE

## 2025-06-11 PROCEDURE — 87340 HEPATITIS B SURFACE AG IA: CPT | Performed by: INTERNAL MEDICINE

## 2025-06-11 PROCEDURE — 86803 HEPATITIS C AB TEST: CPT | Performed by: INTERNAL MEDICINE

## 2025-06-11 PROCEDURE — 85025 COMPLETE CBC W/AUTO DIFF WBC: CPT | Performed by: INTERNAL MEDICINE

## 2025-06-11 PROCEDURE — 99214 OFFICE O/P EST MOD 30 MIN: CPT | Performed by: INTERNAL MEDICINE

## 2025-06-11 PROCEDURE — 3078F DIAST BP <80 MM HG: CPT | Performed by: INTERNAL MEDICINE

## 2025-06-11 PROCEDURE — 3074F SYST BP LT 130 MM HG: CPT | Performed by: INTERNAL MEDICINE

## 2025-06-11 PROCEDURE — 86704 HEP B CORE ANTIBODY TOTAL: CPT | Performed by: INTERNAL MEDICINE

## 2025-06-11 NOTE — PROGRESS NOTES
Rheumatology Clinic Visit      iVet Ohara MRN# [id number]   YOB: 1975 Age: 50 year old      Date of visit: 6/11/25   PCP: Dr. Trina Espinoza    Chief Complaint   Patient presents with:  RECHECK: Reactive arthritis    Assessment and Plan     1. Reactive arthritis: From chart review, he was in [place] where he developed a gastrointestinal illness and was later diagnosed with reactive arthritis with dactylitis in 2012. Per the patient, previous treatments include prednisone (ineffective), methotrexate (ineffective), sulfasalazine (ineffective), then Humira that was very effective.  At one point he discontinued Humira because he was doing well and all of the symptoms returned so he restarted Humira with resolution of the symptoms.  Currently taking Humira every 3 weeks, with mild symptoms returning if he extends the interval beyond this.  In 2024 he had recurrence of left fifth digit trigger finger after missing Humira for 2 months; steroid injection at that time resolved the trigger finger and he has done well since he has been consistent with Humira.  Discussed possibility of insurance requiring a biosimilar.  Labs today.  Chronic illness, stable.    - Continue Humira 40 mg subcutaneous every 21 days  - Labs today: CBC, Creatinine, Hepatic Panel, ESR, CRP, QuantiFERON-TB gold plus, Hepatitis B core antibody and surface antigen, hepatitis C antibody    2.  Primary osteoarthritis of the right first CMC joint: Has improved from topical Voltaren gel and intra-articular injections.  Not an issue at this time.    3.  History of left fifth digit trigger finger: At least 1 month duration.  Started after several missed doses of Humira.  Resolved after steroid injection in 2024 and has not recurred since taking Humira regularly.      4.  Vaccinations: Vaccinations reviewed with Mr. Ohara.  Risks and benefits of vaccinations were discussed.  He says he does not have time for vaccinations at this time but  would like to receive them in the future and plans on making appointment with his primary care clinic or pharmacy to receive the vaccinations  - Influenza: encouraged yearly vaccination  - [id number]: advised vaccination   - Shingrix: advised vaccination  - TDAP: Advised vaccination  - COVID-19: Advised keeping updated    Total minutes spent in evaluation with patient, documentation, , and review of pertinent studies and chart notes: 16  The longitudinal plan of care for the rheumatology problem(s) were addressed during this visit.  Due to added complexity of care, we will continue to support the patient and the subsequent management of this condition with ongoing continuity of care.       Mr. Ohara verbalized agreement with and understanding of the rational for the diagnosis and treatment plan.  All questions were answered to best of my ability and the patient's satisfaction. Mr. Ohara was advised to contact the clinic with any questions that may arise after the clinic visit.      Thank you for involving me in the care of the patient    Return to clinic: 1 year    HPI   Viet Ohara is a 50 year old male with a medical history significant for reactive arthritis, rupture of plantaris tendon, and history of trigger finger who presents for follow-up of reactive arthritis.    5/17/2016: no show to scheduled clinic visit    6/14/2016: Mr. Ohara reported that prednisone was effective in the past but NSAIDs, methotrexate, and sulfasalazine were ineffective. Humira has been very effective. He discontinued Humira one point and all the symptoms returned. Later, he started taking Humira every 3 weeks instead of every 2 weeks and his symptoms returned.    5/16/2017: no show to scheduled clinic visit    9/14/2017: evaluated in clinic    8/9/2018: he reported doing very well with no joint pain when taking Humira at least every 4 weeks.  He tried changing the Humira to be every 3 weeks and felt well so changed  Humira to every 4 weeks with no worsening symptoms.  He did not take humira for 6 weeks once and had more pain in his left hand that resolved with taking the next Humira dose.       8/8/2019: no show to scheduled clinic visit    9/20/2019: Mr. Ohara reports that he is doing well on Humira every 21 days.  If he waits that Humira every 4 weeks and has return of symptoms before the next shot.  Happy with how well he is doing and does not want to change anything.  No joint swelling or pain at this time.  No morning stiffness.    2/10/2021: no show to scheduled appointment    3/2/2021: Currently taking Humira q3 weeks and if delayed passes interval then he has return of symptoms.  Pain at his right thumb, where it hurts at the IP joint but if he palpates on the palmar aspect of the MCP joint near the A1 pulley then he has some tenderness and this has been an issue for about 1 month.  He says that this is similar to the trigger finger symptoms he had in the past, that was initial presentation of his reactive arthritis, per patient and injections were very helpful at that time but he prefers not to have injections because of the associated pain with the injection itself but if absolutely needed then he would be okay with it.  Has ibuprofen at home that he finds effective for headaches.    11/19/2021: Humira every 3 weeks is very effective and if he goes for 4 weeks without Humira then he notices more joint pain.  Right first CMC joint aches with increased activity and improves with rest.  Has not tried treating his right first CMC joint pain.  Has not been to hand therapy.  No swelling of the right first CMC joint.    Today, 2/7/2022: He missed his appointment earlier today and was rescheduled for the afternoon.  Would like a steroid injection of the right first CMC joint where he has pain that is worse with activity and improves with rest.  He also notes having swelling of the right fifth PIP for about the past 10 days  and he would like steroids for this.  Tolerating Humira well.  Other joints are doing well.  No fatigue.    3/4/2022: no show to scheduled appointment    8/19/2022: patient cancelled appointment.    12/20/2022: Currently doing well.  No joint pain or swelling.  No morning stiffness or gelling phenomenon.  Arthritis is not limiting his daily activities.  No fatigue.  No eye pain or redness.  No black or bloody stools.    6/20/2023: No-show to scheduled appointment.    7/13/2023: Patient reports that he is doing well with regard to arthritis.  No joint pain or swelling.  No morning stiffness or gelling phenomenon.  Instead of taking Humira every 3 weeks there was one time where he went 5 weeks before the next dose and during that time he had return of joint symptoms, primarily at the MCPs.  No injection site reactions.  Occasionally with situational anxiety; following with a therapist and he plans to speak with his primary care provider about having Xanax on hand as needed because he has found it to be helpful in the past.    6/13/2024: Missed a couple months of Humira recently because of an insurance change and not having coverage for a period of time.  Then starting about 1-2 months ago with left fifth digit trigger finger and he like a steroid injection for this today.  He recalls having many trigger fingers in the past prior to the diagnosis of a spondylarthritis.    Today, 6/11/2025: Currently doing well with regard to reactive arthritis.  No joint pain or swelling.  No morning stiffness or gelling phenomenon.  Previous steroid injection for the left fifth digit trigger finger resolved the trigger finger and he has not had recurrence since then.  He recalls having trigger fingers when he first had reactive arthritis diagnosed and questions if the trigger finger at that time was because he had missed Humira for 2 months.  He was late taking Humira by 1 week and had worsening symptoms during that time; he has no  inflammatory arthritis symptoms if he takes Humira every 21 days.    Denies fevers, chills, nausea, vomiting, constipation, diarrhea. No abdominal pain.  No black or bloody stools.  No hematuria.  No chest pain/pressure, palpitations, or shortness of breath. No oral or nasal sores. No neck pain. No rash. No LE swelling.  No eye pain or redness.  No dry eye or dry mouth.    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[employer]; still working as a     ROS   12 point review of system was completed and negative except as noted in the HPI     Active Problem List     Patient Active Problem List   Diagnosis    CARDIOVASCULAR SCREENING; LDL GOAL LESS THAN 160    Trigger finger    Reactive arthritis (H)    Encounter for long-term current use of medication    Rupture of plantaris tendon    Baker's cyst of knee    High risk medication use     Past Medical History     Past Medical History:   Diagnosis Date    CARDIOVASCULAR SCREENING; LDL GOAL LESS THAN 160 10/31/2010    Reactive arthritis (H)     Trigger finger     Bilateral Hands that require periodic steroid shots     Past Surgical History     Past Surgical History:   Procedure Laterality Date    [facility] NONSPECIFIC PROCEDURE      multiple hand/ortho for trigger finger release x 4     Allergy     Allergies   Allergen Reactions    Nkda [No Known Drug Allergy]      Current Medication List     Current Outpatient Medications   Medication Sig Dispense Refill    Adalimumab (HUMIRA *CF*) 40 MG/0.4ML pen kit Inject 0.4 mLs (40 mg) subcutaneously every 21 days. Hold for infection and seek medical attention. Refills at rheumatology follow-up appointments. 0.8 mL 8    diclofenac (VOLTAREN) 1 % topical gel Apply up to 2 grams of 1% gel to hand up to 4 times daily as needed for joint pain (maximum: 8 g per upper extremity per day) (Patient not taking: Reported on 11/6/2024) 200 g 3    LORazepam (ATIVAN) 0.5 MG tablet Take 1  "tablet (0.5 mg) by mouth every 8 hours as needed for anxiety. 10 tablet 0     No current facility-administered medications for this visit.     Social History   See HPI    Family History     Family History   Problem Relation Age of Onset    Cancer Paternal Grandfather         lung +tob    C.A.D. No family hx of     Diabetes No family hx of     Hypertension No family hx of     Cerebrovascular Disease No family hx of     Connective Tissue Disorder No family hx of      Physical Exam     Temp Readings from Last 3 Encounters:   06/02/25 98.6  F (37  C) (Temporal)   11/06/24 97.5  F (36.4  C) (Temporal)   09/14/17 97.3  F (36.3  C) (Oral)     BP Readings from Last 5 Encounters:   06/11/25 115/76   06/02/25 123/78   11/06/24 135/89   06/12/24 134/78   07/13/23 120/86     Pulse Readings from Last 1 Encounters:   06/11/25 59     Resp Readings from Last 1 Encounters:   06/02/25 16     Estimated body mass index is 23.87 kg/m  as calculated from the following:    Height as of 6/2/25: 1.905 m (6' 3\").    Weight as of 6/2/25: 86.6 kg (191 lb).    GEN: NAD.   HEENT:  Anicteric, noninjected sclera. No obvious external lesions of the ear and nose. Hearing intact.  CV: S1, S2. RRR. No m/r/g  PULM: No increased work of breathing. CTA bilaterally   MSK: MCPs, PIPs, DIPs without swelling or tenderness to palpation.  No active trigger finger.  Wrists without swelling or tenderness to palpation.  Elbows and shoulders without swelling or tenderness to palpation.  Knees, ankles, and MTPs without swelling or tenderness to palpation.  Achilles tendons nontender to palpation.  SKIN: No rash or jaundice seen  PSYCH: Alert. Appropriate.      Labs / Imaging (select studies)       CBC  Recent Labs   Lab Test 06/11/24  1202 06/14/23  0902 08/08/22  0911   WBC 6.7 5.7 5.2   RBC 4.78 4.60 4.64   HGB 14.9 14.7 14.5   HCT 44.1 43.3 41.9   MCV 92 94 90   RDW 12.4 12.7 12.2    301 267   MCH 31.2 32.0 31.3   MCHC 33.8 33.9 34.6   NEUTROPHIL 39 43 43 "   LYMPH 48 44 45   MONOCYTE 9 8 8   EOSINOPHIL 3 4 3   BASOPHIL 0 1 1   ANEU 2.6 2.4 2.2   ALYM 3.2 2.6 2.3   BALWINDER 0.6 0.4 0.4   AEOS 0.2 0.2 0.2   ABAS 0.0 0.0 0.0     CMP  Recent Labs   Lab Test 06/11/24  1202 06/14/23  0902 08/08/22  0911 08/17/21  0910 09/20/19  0900 08/09/18  0902 09/14/17  0920   CR 0.92 0.95 0.81   < > 0.89 0.95 0.83   GFRESTIMATED >90 >90 >90   < > >90 87 >90   GFRESTBLACK  --   --   --   --  >90 >90 >90   LULU  --   --   --   --   --   --  8.8   BILITOTAL 1.0 0.9 1.0   < > 0.8 1.3 1.2   ALBUMIN 4.5 4.3 3.8   < > 3.8 3.8 3.8   PROTTOTAL 8.0 7.2 7.3   < > 7.3 7.4 7.4   ALKPHOS 56 46 51   < > 49 51 50   AST 32 29 28   < > 21 23 20   ALT 30 24 30   < > 24 25 30    < > = values in this interval not displayed.     Calcium/VitaminD  Recent Labs   Lab Test 09/14/17  0920   LULU 8.8   VITDT 33     ESR/CRP  Recent Labs   Lab Test 06/11/24  1202 06/14/23  0902 08/08/22  0911 08/17/21  0910 08/17/21  0910 09/20/19  0900 08/09/18  0902   SED 10 6 6   < > 8 8 7   CRP  --   --   --   --  <2.9 <2.9 <2.9   CRPI <3.00 <3.00 <3.00  --   --   --   --     < > = values in this interval not displayed.     Tuberculosis Screening  Recent Labs   Lab Test 08/08/22  0911 08/17/21  0910 08/09/18  0902   TBRES Negative Negative Negative       Immunization History     Immunization History   Administered Date(s) Administered    COVID-19 12+ (MODERNA) 10/11/2023    COVID-19 12+ (Pfizer) 09/26/2024    COVID-19 Bivalent 12+ (Pfizer) 09/25/2022    COVID-19 Monovalent 18+ (Moderna) 11/20/2021, 05/05/2022    COVID-19 Vaccine (Priti) 04/08/2021    Influenza (IIV3) PF 11/15/2011    Influenza Vaccine >6 months,quad, PF 11/19/2021    Influenza, Split Virus, Trivalent, Pf (Fluzone\Fluarix) 09/26/2024    Pneumo Conj 13-V (2010&after) 09/14/2017    Pneumococcal 23 valent 01/17/2013    TDAP Vaccine (Adacel) 02/20/2015          Chart documentation done in part with Dragon Voice recognition Software. Although reviewed after completion,  some word and grammatical error may remain.    Roosevelt Flores MD

## 2025-06-11 NOTE — NURSING NOTE
RAPID3 (0-30) Cumulative Score  2          RAPID3 Weighted Score (divide #4 by 3 and that is the weighted score)  0.6